# Patient Record
Sex: FEMALE | Race: BLACK OR AFRICAN AMERICAN | Employment: PART TIME | URBAN - METROPOLITAN AREA
[De-identification: names, ages, dates, MRNs, and addresses within clinical notes are randomized per-mention and may not be internally consistent; named-entity substitution may affect disease eponyms.]

---

## 2021-01-09 RX ORDER — METOPROLOL TARTRATE 50 MG/1
TABLET ORAL
Qty: 60 TAB | Refills: 0 | Status: SHIPPED | OUTPATIENT
Start: 2021-01-09 | End: 2021-02-15 | Stop reason: SDUPTHER

## 2021-01-11 NOTE — TELEPHONE ENCOUNTER
Called pt left message on voice mail we gave you 30 day but to continue getting refills please call the office to schedule and appointment and Dr. Remy Mckinley is booking into February 9th now.

## 2021-02-15 ENCOUNTER — OFFICE VISIT (OUTPATIENT)
Dept: INTERNAL MEDICINE CLINIC | Age: 56
End: 2021-02-15
Payer: COMMERCIAL

## 2021-02-15 VITALS
OXYGEN SATURATION: 98 % | TEMPERATURE: 98.3 F | SYSTOLIC BLOOD PRESSURE: 138 MMHG | DIASTOLIC BLOOD PRESSURE: 82 MMHG | WEIGHT: 201.6 LBS | BODY MASS INDEX: 35.72 KG/M2 | HEART RATE: 64 BPM | HEIGHT: 63 IN | RESPIRATION RATE: 18 BRPM

## 2021-02-15 DIAGNOSIS — E55.9 VITAMIN D DEFICIENCY: ICD-10-CM

## 2021-02-15 DIAGNOSIS — I10 ESSENTIAL HYPERTENSION: ICD-10-CM

## 2021-02-15 DIAGNOSIS — Z12.31 ENCOUNTER FOR SCREENING MAMMOGRAM FOR BREAST CANCER: ICD-10-CM

## 2021-02-15 DIAGNOSIS — E66.01 MORBID OBESITY (HCC): ICD-10-CM

## 2021-02-15 DIAGNOSIS — F41.9 ANXIETY AND DEPRESSION: ICD-10-CM

## 2021-02-15 DIAGNOSIS — K21.9 GASTROESOPHAGEAL REFLUX DISEASE WITHOUT ESOPHAGITIS: ICD-10-CM

## 2021-02-15 DIAGNOSIS — M54.41 CHRONIC BILATERAL LOW BACK PAIN WITH BILATERAL SCIATICA: ICD-10-CM

## 2021-02-15 DIAGNOSIS — M54.42 CHRONIC BILATERAL LOW BACK PAIN WITH BILATERAL SCIATICA: ICD-10-CM

## 2021-02-15 DIAGNOSIS — M25.551 HIP PAIN, RIGHT: ICD-10-CM

## 2021-02-15 DIAGNOSIS — E78.00 PURE HYPERCHOLESTEROLEMIA: ICD-10-CM

## 2021-02-15 DIAGNOSIS — Z00.00 ANNUAL PHYSICAL EXAM: ICD-10-CM

## 2021-02-15 DIAGNOSIS — F32.A ANXIETY AND DEPRESSION: ICD-10-CM

## 2021-02-15 DIAGNOSIS — G89.29 CHRONIC BILATERAL LOW BACK PAIN WITH BILATERAL SCIATICA: ICD-10-CM

## 2021-02-15 DIAGNOSIS — Z12.11 SCREENING FOR COLON CANCER: ICD-10-CM

## 2021-02-15 PROCEDURE — 99396 PREV VISIT EST AGE 40-64: CPT | Performed by: INTERNAL MEDICINE

## 2021-02-15 PROCEDURE — 99213 OFFICE O/P EST LOW 20 MIN: CPT | Performed by: INTERNAL MEDICINE

## 2021-02-15 RX ORDER — OMEPRAZOLE 40 MG/1
40 CAPSULE, DELAYED RELEASE ORAL DAILY
Qty: 90 CAP | Refills: 0 | Status: SHIPPED | OUTPATIENT
Start: 2021-02-15 | End: 2021-10-21

## 2021-02-15 RX ORDER — METOPROLOL TARTRATE 50 MG/1
50 TABLET ORAL 2 TIMES DAILY
Qty: 180 TAB | Refills: 1 | Status: SHIPPED | OUTPATIENT
Start: 2021-02-15 | End: 2021-06-07

## 2021-02-15 RX ORDER — CITALOPRAM 20 MG/1
20 TABLET, FILM COATED ORAL DAILY
Qty: 90 TAB | Refills: 1 | Status: SHIPPED | OUTPATIENT
Start: 2021-02-15 | End: 2021-10-21

## 2021-02-15 RX ORDER — BUPROPION HYDROCHLORIDE 75 MG/1
75 TABLET ORAL DAILY
Qty: 90 TAB | Refills: 0 | Status: SHIPPED | OUTPATIENT
Start: 2021-02-15 | End: 2021-06-07

## 2021-02-15 RX ORDER — MELOXICAM 15 MG/1
15 TABLET ORAL
Qty: 30 TAB | Refills: 1 | Status: SHIPPED | OUTPATIENT
Start: 2021-02-15 | End: 2021-03-24 | Stop reason: SDUPTHER

## 2021-02-15 NOTE — PROGRESS NOTES
Fabiola Reynaga is a 63 y.o. female and presents with Annual Wellness Visit    Ms. Agustina Chahal actually came for annual physical she has not consulted me since, November 2019. Recently she has got insurance. she has not done any recent blood work for last 2 to 3 years. She also has underlying medical problems and she needs refills. She agreed to do mammogram and Cologuard but not screening colonoscopy for screening colon cancer. She has also underlying problems of hypertension, hypercholesteremia, anxiety with depression and vitamin D deficiency. Today her blood pressure is slightly elevated. She has taken her medicine before coming here. no headache or dizziness. No negative thoughts. according to her in between she was helping her daughter's children or grandchildren to do virtual visit for the school/and at that time she has stress living with her daughter who was temporarily  from her  but now children are with her mother so she is feeling better. She is non-smoker and nonalcoholic and she is not uterine cancer survivor. She is complaining of back pain and sometimes she feels like she will fall down no history of fall, no history of, tingling numbness or neurological weakness but pain is radiating in the direction of lumbar nerve and also in buttocks, no urinary incontinence she works at [employer] where she has to [employer],  Pleasant  Review of Systems    Review of Systems   Constitutional: Negative. HENT: Negative for sinus pain and sore throat. Eyes: Negative for blurred vision. Respiratory: Negative for cough, shortness of breath and wheezing. Cardiovascular: Negative. Gastrointestinal: Negative. Genitourinary: Negative. Musculoskeletal: Positive for back pain. Negative for falls, joint pain, myalgias and neck pain. Lower back and h/o sciatica. Neurological: Negative for dizziness, tingling, tremors and headaches.    Endo/Heme/Allergies: Negative for polydipsia. Psychiatric/Behavioral: Negative for depression and memory loss. The patient is not nervous/anxious and does not have insomnia. Past Medical History:   Diagnosis Date    Arthritis     Depression     GERD (gastroesophageal reflux disease)     Hypertension      Past Surgical History:   Procedure Laterality Date    HX TONSILLECTOMY      HX TUBAL LIGATION       Social History     Socioeconomic History    Marital status:      Spouse name: Not on file    Number of children: Not on file    Years of education: Not on file    Highest education level: Not on file   Tobacco Use    Smoking status: Never Smoker    Smokeless tobacco: Never Used   Substance and Sexual Activity    Alcohol use: Not Currently     Family History   Problem Relation Age of Onset    Heart Disease Mother     Arthritis-osteo Father     Heart Disease Father      Current Outpatient Medications   Medication Sig Dispense Refill    citalopram (CELEXA) 20 mg tablet Take 1 Tab by mouth daily. 90 Tab 1    buPROPion (WELLBUTRIN) 75 mg tablet Take 1 Tab by mouth daily. 90 Tab 0    metoprolol tartrate (LOPRESSOR) 50 mg tablet Take 1 Tab by mouth two (2) times a day. 180 Tab 1    meloxicam (MOBIC) 15 mg tablet Take 1 Tab by mouth daily as needed for Pain. 30 Tab 1    omeprazole (PRILOSEC) 40 mg capsule Take 1 Cap by mouth daily. Take one pill 30 minutes before eating once a day. 90 Cap 0     No Known Allergies    Objective:  Visit Vitals  /82 (BP 1 Location: Right upper arm, BP Patient Position: Sitting, BP Cuff Size: Adult)   Pulse 64   Temp 98.3 °F (36.8 °C) (Oral)   Resp 18   Ht 5' 3\" (1.6 m)   Wt 201 lb 9.6 oz (91.4 kg)   SpO2 98%   BMI 35.71 kg/m²       Physical Exam:   Constitutional: General Appearance: . Pleasant level of Distress: NAD. Psychiatric: Mental Status: normal mood and affect Orientation: to time, place, and person. ,normal eye contact. Head: Head: normocephalic and atraumatic.    Eyes: Pupils: PERRLA. Sclerae: non-icteric. Neck: Neck: supple, trachea midline, and no masses. Lymph Nodes: no cervical LAD. Thyroid: no enlargement or nodules and non-tender. Lungs: Respiratory effort: no dyspnea. Auscultation: no wheezing, rales/crackles, or rhonchi and breath sounds normal and good air movement. Cardiovascular: Apical Impulse: not displaced. Heart Auscultation: normal S1 and S2; no murmurs, rubs, or gallops; and RRR. Neck vessels: no carotid bruits. Pulses including femoral / pedal: normal throughout. Abdomen: Bowel Sounds: normal. Inspection and Palpation: no tenderness, guarding, or masses and soft and non-distended. Liver: non-tender and no hepatomegaly. Spleen: non-tender and no splenomegaly. Musculoskeletal[de-identified] Extremities: no edema,no varicosities. No Calf tenderness. Neurologic: Gait and Station: normal gait and station. Motor Strength normal right and left. Skin: Inspection and palpation: no rash, lesions, or ulcer. No results found for this or any previous visit. Assessment/Plan:      ICD-10-CM ICD-9-CM    1. Annual physical exam  Z00.00 V70.0 CBC WITH AUTOMATED DIFF      METABOLIC PANEL, COMPREHENSIVE      LIPID PANEL   2. Essential hypertension  I10 401.9 CBC WITH AUTOMATED DIFF      METABOLIC PANEL, COMPREHENSIVE      TSH 3RD GENERATION   3. Pure hypercholesterolemia  E78.00 272.0 LIPID PANEL      TSH 3RD GENERATION   4. Anxiety and depression  F41.9 300.00     F32.9 311    5. Chronic bilateral low back pain with bilateral sciatica  M54.42 724.2 XR HIP RT W OR WO PELV 2-3 VWS    M54.41 724.3 XR SPINE LUMB COMP W BEND    G89.29 338.29 XR SPINE LUMB MIN 4 V   6. Hip pain, right  M25.551 719.45 XR HIP RT W OR WO PELV 2-3 VWS      XR SPINE LUMB COMP W BEND      XR SPINE LUMB MIN 4 V   7. Morbid obesity ([address]  E66.01 278.01    8. Gastroesophageal reflux disease without esophagitis  K21.9 530.81    9. Vitamin D deficiency  E55.9 268.9 VITAMIN D, 25 HYDROXY   10.  Encounter for screening mammogram for breast cancer  Z12.31 V76.12 KAREN 3D CHRISTOPHER W MAMMO BI SCREENING INCL CAD   6. Screening for colon cancer  Z12.11 V76.51 COLOGUARD TEST (FECAL DNA COLORECTAL CANCER SCREENING)     Orders Placed This Encounter    KAREN 3D CHRISTOPHER W MAMMO BI SCREENING INCL CAD     Standing Status:   Future     Standing Expiration Date:   4/15/2021    XR HIP RT W OR WO PELV 2-3 VWS     Standing Status:   Future     Standing Expiration Date:   3/15/2021    XR SPINE LUMB COMP W BEND     Standing Status:   Future     Standing Expiration Date:   3/15/2021    XR SPINE LUMB MIN 4 V     Standing Status:   Future     Standing Expiration Date:   3/15/2021     Scheduling Instructions:      Chronic low backache with b/l sciatica r/o ls stenosis and severe djd    CBC WITH AUTOMATED DIFF    METABOLIC PANEL, COMPREHENSIVE    LIPID PANEL    TSH 3RD GENERATION    VITAMIN D, 25 HYDROXY    COLOGUARD TEST (FECAL DNA COLORECTAL CANCER SCREENING)    citalopram (CELEXA) 20 mg tablet     Sig: Take 1 Tab by mouth daily. Dispense:  90 Tab     Refill:  1    buPROPion (WELLBUTRIN) 75 mg tablet     Sig: Take 1 Tab by mouth daily. Dispense:  90 Tab     Refill:  0    metoprolol tartrate (LOPRESSOR) 50 mg tablet     Sig: Take 1 Tab by mouth two (2) times a day. Dispense:  180 Tab     Refill:  1    meloxicam (MOBIC) 15 mg tablet     Sig: Take 1 Tab by mouth daily as needed for Pain. Dispense:  30 Tab     Refill:  1    omeprazole (PRILOSEC) 40 mg capsule     Sig: Take 1 Cap by mouth daily. Take one pill 30 minutes before eating once a day. Dispense:  90 Cap     Refill:  0         Annual preventive physical examination age-appropriate preventive screening and vaccinations advised. She agreed to have mammogram and she agreed for screening colon cancer but she wants to do Cologuard and explained the benefits and indications and contraindications for doing Cologuard.   She does not have any abnormal bowel movements and no history of weight loss no personal or family history of colon cancer. Recommended to get annual flu vaccine and shingles vaccine. Recommended to have heart healthy diet exercise minimum 150 minutes/week at least walking is the best exercise and weightbearing exercise twice a week. ,She is complaining of chronic low back pain radiating to both buttocks right more than left and right hip pain worse than left hip pain she wants to do x-ray before physical therapy x-ray of the right hip joint and, lower back ordered actually she has bilateral hip pain but right is worse. Most likely due to DJD. x-ray of the right hip joint and lower back ordered. Started on meloxicam 15 mg once a day on also continued on omeprazole having history of GERD to prevent NSAID induced gastritis. I recommended her to have physical therapy that she wants to postpone  until x-ray comes back and medicines to be started. Side effects of meloxicam on long-term basis explained including elevation of blood pressure and damage to the liver and kidney. And gastritis. Today I will not make changes in her antihypertensive medicines. I will repeat her blood pressure in next 4 weeks. GERD started on omeprazole and she does not want to be on famotidine. Hypercholesteremia I ordered labs before continuing her pravastatin 10 mg at bedtime that she was taking. Hypertension taking metoprolol tartrate 50 mg twice a day. Refills given. Diet low in sodium. History of anxiety and mild depression continued on bupropion 75 mg once a day and citalopram 20 mg at bedtime. No negative thoughts. She works at [employer]. No signs and symptoms of suicide or homicide. She is non-smoker nonalcoholic. Morbid obesity heart healthy diet and exercise to bring her BMI down currently it is 35.71. History of uterine cancer survivor. Labs ordered. X-ray ordered. Follow-up in 4 weeks. Answered all her questions.     lose weight, increase physical activity, follow low fat diet, follow low salt diet, routine labs ordered, call if any problems    There are no Patient Instructions on file for this visit. Follow-up and Dispositions    · Return in about 3 weeks (around 3/8/2021) for lab discussion ,xray discussion,fas labs. mammo,cologuard. Leda Reas

## 2021-02-26 ENCOUNTER — HOSPITAL ENCOUNTER (OUTPATIENT)
Dept: GENERAL RADIOLOGY | Age: 56
Discharge: HOME | End: 2021-02-26
Attending: INTERNAL MEDICINE
Payer: COMMERCIAL

## 2021-02-26 ENCOUNTER — HOSPITAL ENCOUNTER (OUTPATIENT)
Dept: MAMMOGRAPHY | Age: 56
Discharge: HOME | End: 2021-02-26
Attending: INTERNAL MEDICINE
Payer: COMMERCIAL

## 2021-02-26 DIAGNOSIS — M54.41 CHRONIC BILATERAL LOW BACK PAIN WITH BILATERAL SCIATICA: ICD-10-CM

## 2021-02-26 DIAGNOSIS — G89.29 CHRONIC BILATERAL LOW BACK PAIN WITH BILATERAL SCIATICA: ICD-10-CM

## 2021-02-26 DIAGNOSIS — M54.42 CHRONIC BILATERAL LOW BACK PAIN WITH BILATERAL SCIATICA: ICD-10-CM

## 2021-02-26 DIAGNOSIS — M25.551 HIP PAIN, RIGHT: ICD-10-CM

## 2021-02-26 DIAGNOSIS — Z12.31 ENCOUNTER FOR SCREENING MAMMOGRAM FOR BREAST CANCER: ICD-10-CM

## 2021-02-26 PROCEDURE — 77067 SCR MAMMO BI INCL CAD: CPT

## 2021-02-26 PROCEDURE — 73502 X-RAY EXAM HIP UNI 2-3 VIEWS: CPT

## 2021-02-26 PROCEDURE — 72110 X-RAY EXAM L-2 SPINE 4/>VWS: CPT

## 2021-02-26 NOTE — PROGRESS NOTES
Please call her that her x-ray of the lumbosacral spine is normal showing just needs stretching exercise if she wants I can send for physical therapy and heating pad application and meloxicam only, to be taken as needed sparingly,

## 2021-02-26 NOTE — PROGRESS NOTES
Pl call her she has mild arthritis called grey so if she wants we can send for PT and try to loose weight ,and take meds as needed PT dose help.

## 2021-02-27 LAB
25(OH)D3+25(OH)D2 SERPL-MCNC: 25.5 NG/ML (ref 30–100)
ALBUMIN SERPL-MCNC: 4.4 G/DL (ref 3.8–4.9)
ALBUMIN/GLOB SERPL: 2.2 {RATIO} (ref 1.2–2.2)
ALP SERPL-CCNC: 105 IU/L (ref 39–117)
ALT SERPL-CCNC: 21 IU/L (ref 0–32)
AST SERPL-CCNC: 22 IU/L (ref 0–40)
BASOPHILS # BLD AUTO: 0.1 X10E3/UL (ref 0–0.2)
BASOPHILS NFR BLD AUTO: 1 %
BILIRUB SERPL-MCNC: 0.5 MG/DL (ref 0–1.2)
BUN SERPL-MCNC: 12 MG/DL (ref 6–24)
BUN/CREAT SERPL: 12 (ref 9–23)
CALCIUM SERPL-MCNC: 9.8 MG/DL (ref 8.7–10.2)
CHLORIDE SERPL-SCNC: 104 MMOL/L (ref 96–106)
CHOLEST SERPL-MCNC: 242 MG/DL (ref 100–199)
CO2 SERPL-SCNC: 25 MMOL/L (ref 20–29)
CREAT SERPL-MCNC: 0.99 MG/DL (ref 0.57–1)
EOSINOPHIL # BLD AUTO: 0.3 X10E3/UL (ref 0–0.4)
EOSINOPHIL NFR BLD AUTO: 5 %
ERYTHROCYTE [DISTWIDTH] IN BLOOD BY AUTOMATED COUNT: 13.6 % (ref 11.7–15.4)
GLOBULIN SER CALC-MCNC: 2 G/DL (ref 1.5–4.5)
GLUCOSE SERPL-MCNC: 98 MG/DL (ref 65–99)
HCT VFR BLD AUTO: 41.8 % (ref 34–46.6)
HDLC SERPL-MCNC: 44 MG/DL
HGB BLD-MCNC: 13.9 G/DL (ref 11.1–15.9)
IMM GRANULOCYTES # BLD AUTO: 0 X10E3/UL (ref 0–0.1)
IMM GRANULOCYTES NFR BLD AUTO: 0 %
LDLC SERPL CALC-MCNC: 179 MG/DL (ref 0–99)
LYMPHOCYTES # BLD AUTO: 3.6 X10E3/UL (ref 0.7–3.1)
LYMPHOCYTES NFR BLD AUTO: 48 %
MCH RBC QN AUTO: 29.2 PG (ref 26.6–33)
MCHC RBC AUTO-ENTMCNC: 33.3 G/DL (ref 31.5–35.7)
MCV RBC AUTO: 88 FL (ref 79–97)
MONOCYTES # BLD AUTO: 0.5 X10E3/UL (ref 0.1–0.9)
MONOCYTES NFR BLD AUTO: 6 %
NEUTROPHILS # BLD AUTO: 3.1 X10E3/UL (ref 1.4–7)
NEUTROPHILS NFR BLD AUTO: 40 %
PLATELET # BLD AUTO: 292 X10E3/UL (ref 150–450)
POTASSIUM SERPL-SCNC: 4.5 MMOL/L (ref 3.5–5.2)
PROT SERPL-MCNC: 6.4 G/DL (ref 6–8.5)
RBC # BLD AUTO: 4.76 X10E6/UL (ref 3.77–5.28)
SODIUM SERPL-SCNC: 141 MMOL/L (ref 134–144)
TRIGL SERPL-MCNC: 107 MG/DL (ref 0–149)
TSH SERPL DL<=0.005 MIU/L-ACNC: 1.22 UIU/ML (ref 0.45–4.5)
VLDLC SERPL CALC-MCNC: 19 MG/DL (ref 5–40)
WBC # BLD AUTO: 7.6 X10E3/UL (ref 3.4–10.8)

## 2021-02-28 NOTE — PROGRESS NOTES
Please call her that her cholesterol is up we can start her on atorvastatin 10 mg at bedtime , ask her to cut back saturated fats like all butter cheese fried food fast food. Start walking. At least to begin with 15 minutes a day and increase to 30 minutes a day,    Ask her to take over-the-counter vitamin D 2000 unit/day and multivitamin once a day.

## 2021-03-02 RX ORDER — ATORVASTATIN CALCIUM 10 MG/1
10 TABLET, FILM COATED ORAL
Qty: 90 TAB | Refills: 0 | Status: SHIPPED | OUTPATIENT
Start: 2021-03-02 | End: 2021-10-21

## 2021-03-17 PROBLEM — Z00.00 ANNUAL PHYSICAL EXAM: Status: RESOLVED | Noted: 2021-02-15 | Resolved: 2021-03-17

## 2021-03-17 PROBLEM — Z12.31 ENCOUNTER FOR SCREENING MAMMOGRAM FOR BREAST CANCER: Status: RESOLVED | Noted: 2021-02-15 | Resolved: 2021-03-17

## 2021-03-17 PROBLEM — Z12.11 SCREENING FOR COLON CANCER: Status: RESOLVED | Noted: 2021-02-15 | Resolved: 2021-03-17

## 2021-03-21 ENCOUNTER — HOSPITAL ENCOUNTER (EMERGENCY)
Age: 56
Discharge: HOME | End: 2021-03-21
Attending: EMERGENCY MEDICINE
Payer: COMMERCIAL

## 2021-03-21 VITALS
SYSTOLIC BLOOD PRESSURE: 149 MMHG | OXYGEN SATURATION: 97 % | WEIGHT: 201 LBS | HEART RATE: 70 BPM | DIASTOLIC BLOOD PRESSURE: 86 MMHG | BODY MASS INDEX: 35.61 KG/M2 | RESPIRATION RATE: 17 BRPM | TEMPERATURE: 97.8 F | HEIGHT: 63 IN

## 2021-03-21 DIAGNOSIS — T17.208A FOREIGN BODY IN PHARYNX, INITIAL ENCOUNTER: Primary | ICD-10-CM

## 2021-03-21 PROCEDURE — 99283 EMERGENCY DEPT VISIT LOW MDM: CPT

## 2021-03-21 NOTE — LETTER
Celinetobi Noy was seen and treated in our emergency department on 3/21/2021. She may return to work on 3/22/21 If you have any questions or concerns, please don't hesitate to call. Vinayak Berg

## 2021-03-21 NOTE — ED TRIAGE NOTES
Pt reports \"attempting to take my morning medications and I think two are stuck in my throat. \"    Pt speaking in clear sentences with intermittent gagging.

## 2021-03-21 NOTE — ED NOTES
Per Dr Richard Brandt request pt provided water and trailed taking sips to get the medication \"to go down. \"    Pt is now feeling as if the pills moved and left her throat. Dr Richard Brandt requesting [place] her some bread now and see how she does. \"

## 2021-03-21 NOTE — ED PROVIDER NOTES
EMERGENCY DEPARTMENT HISTORY AND PHYSICAL EXAM      Date: 3/21/2021  Patient Name: Cornelio Mcgrath    History of Presenting Illness     Chief Complaint   Patient presents with    Foreign Body Removal       History Provided By: Patient    HPI: Cornelio Mcgrath, 63 y.o. female with a past medical history significant hypertension, hyperlipidemia and GERD presents to the ED with cc of pill being stuck in her throat while swallowing. Patient arrived by EMS for gagging and choking complains of pill stuck in the right side of her throat. She had taken several pills today at 1 time her blood pressure cholesterol med and a large vitamin pill. She became choked and spit up the blood pressure pill she feels like one of the other pills went into her stomach the one is still remains in the throat. Voice is normal there is no stridor on arrival    There are no other complaints, changes, or physical findings at this time. PCP: Miah Barrera MD    No current facility-administered medications on file prior to encounter. Current Outpatient Medications on File Prior to Encounter   Medication Sig Dispense Refill    atorvastatin (LIPITOR) 10 mg tablet Take 1 Tab by mouth nightly for 90 days. 90 Tab 0    citalopram (CELEXA) 20 mg tablet Take 1 Tab by mouth daily. 90 Tab 1    buPROPion (WELLBUTRIN) 75 mg tablet Take 1 Tab by mouth daily. 90 Tab 0    metoprolol tartrate (LOPRESSOR) 50 mg tablet Take 1 Tab by mouth two (2) times a day. 180 Tab 1    meloxicam (MOBIC) 15 mg tablet Take 1 Tab by mouth daily as needed for Pain. 30 Tab 1    omeprazole (PRILOSEC) 40 mg capsule Take 1 Cap by mouth daily. Take one pill 30 minutes before eating once a day.  80 Cap 0       Past History     Past Medical History:  Past Medical History:   Diagnosis Date    Arthritis     Depression     GERD (gastroesophageal reflux disease)     Hypertension        Past Surgical History:  Past Surgical History:   Procedure Laterality Date    HX HYSTERECTOMY      HX TONSILLECTOMY      HX TUBAL LIGATION         Family History:  Family History   Problem Relation Age of Onset    Heart Disease Mother     Arthritis-osteo Father     Heart Disease Father        Social History:  Social History     Tobacco Use    Smoking status: Never Smoker    Smokeless tobacco: Never Used   Substance Use Topics    Alcohol use: Not Currently    Drug use: Never       Allergies:  No Known Allergies      Review of Systems   Review of all other systems negative  Review of Systems    Physical Exam   Pleasant middle-age female gagging retching  Physical Exam  Vitals signs and nursing note reviewed. Constitutional:       General: She is not in acute distress. Appearance: She is not ill-appearing, toxic-appearing or diaphoretic. HENT:      Head: Normocephalic and atraumatic. Nose: Nose normal.      Mouth/Throat:      Mouth: Mucous membranes are moist.      Comments: Posterior pharynx is clear there is no pill or foreign body visible  Eyes:      Conjunctiva/sclera: Conjunctivae normal.   Neck:      Musculoskeletal: Normal range of motion and neck supple. No neck rigidity or muscular tenderness. Comments: Patient points to this area site of pill being stuck  Cardiovascular:      Rate and Rhythm: Normal rate and regular rhythm. Pulses: Normal pulses. Heart sounds: Normal heart sounds. No murmur. Pulmonary:      Effort: Pulmonary effort is normal. No respiratory distress. Breath sounds: Normal breath sounds. No wheezing, rhonchi or rales. Chest:      Chest wall: No tenderness. Abdominal:      General: Abdomen is flat. Palpations: There is no mass. Tenderness: There is no abdominal tenderness. There is no right CVA tenderness, left CVA tenderness, guarding or rebound. Hernia: No hernia is present. Musculoskeletal: Normal range of motion. General: No tenderness, deformity or signs of injury.       Right lower leg: No edema. Left lower leg: No edema. Skin:     General: Skin is warm. Findings: No erythema or rash. Neurological:      General: No focal deficit present. Mental Status: She is alert and oriented to person, place, and time. Cranial Nerves: No cranial nerve deficit. Sensory: No sensory deficit. Motor: No weakness. Psychiatric:         Mood and Affect: Mood normal.         Behavior: Behavior normal.         Thought Content: Thought content normal.         Lab and Diagnostic Study Results     Labs -   No results found for this or any previous visit (from the past 12 hour(s)). Radiologic Studies -   @lastxrresult@  CT Results  (Last 48 hours)    None        CXR Results  (Last 48 hours)    None            Medical Decision Making   - I am the first provider for this patient. - I reviewed the vital signs, available nursing notes, past medical history, past surgical history, family history and social history. - Initial assessment performed. The patients presenting problems have been discussed, and they are in agreement with the care plan formulated and outlined with them. I have encouraged them to ask questions as they arise throughout their visit. Vital Signs-Reviewed the patient's vital signs. Patient Vitals for the past 12 hrs:   Temp Pulse Resp BP SpO2   03/21/21 1117 -- -- -- (!) 149/86 --   03/21/21 1112 -- -- -- -- 98 %   03/21/21 1110 97.8 °F (36.6 °C) 70 18 -- 98 %       Records Reviewed: Nursing Notes and Old Medical Records    The patient presents with pill stuck in throat with a differential diagnosis of foreign body in throat; abrasion from foreign body; esophageal diverticulum; aspiration      ED Course:          Provider Notes (Medical Decision Making):   Approximately 20 minutes after patient arrival she felt the pill had passed into her stomach. she was able to drink water on arrival with normal voice.   Now able to eat bread with no complaints  MDM Procedures   Medical Decision Makingedical Decision Making  Performed by: Bandar Lanza MD  PROCEDURES:  Procedures       Disposition   Disposition: Condition stable and improved  DC- Adult Discharges: All of the diagnostic tests were reviewed and questions answered. Diagnosis, care plan and treatment options were discussed. The patient understands the instructions and will follow up as directed. The patients results have been reviewed with them. They have been counseled regarding their diagnosis. The patient verbally convey understanding and agreement of the signs, symptoms, diagnosis, treatment and prognosis and additionally agrees to follow up as recommended with their PCP in 24 - 48 hours. They also agree with the care-plan and convey that all of their questions have been answered. I have also put together some discharge instructions for them that include: 1) educational information regarding their diagnosis, 2) how to care for their diagnosis at home, as well a 3) list of reasons why they would want to return to the ED prior to their follow-up appointment, should their condition change. DISCHARGE PLAN:  1. Current Discharge Medication List      CONTINUE these medications which have NOT CHANGED    Details   atorvastatin (LIPITOR) 10 mg tablet Take 1 Tab by mouth nightly for 90 days. Qty: 90 Tab, Refills: 0      citalopram (CELEXA) 20 mg tablet Take 1 Tab by mouth daily. Qty: 90 Tab, Refills: 1      buPROPion (WELLBUTRIN) 75 mg tablet Take 1 Tab by mouth daily. Qty: 90 Tab, Refills: 0      metoprolol tartrate (LOPRESSOR) 50 mg tablet Take 1 Tab by mouth two (2) times a day. Qty: 180 Tab, Refills: 1      meloxicam (MOBIC) 15 mg tablet Take 1 Tab by mouth daily as needed for Pain. Qty: 30 Tab, Refills: 1      omeprazole (PRILOSEC) 40 mg capsule Take 1 Cap by mouth daily. Take one pill 30 minutes before eating once a day. Qty: 90 Cap, Refills: 0           2.    Follow-up Information None       3. Return to ED if worse   4. Current Discharge Medication List            Diagnosis     Clinical Impression: Foreign body(pill) stuck in throat resolved  Attestations:    Brian Reinoso MD    Please note that this dictation was completed with [employer], the computer voice recognition software. Quite often unanticipated grammatical, syntax, homophones, and other interpretive errors are inadvertently transcribed by the computer software. Please disregard these errors. Please excuse any errors that have escaped final proofreading. Thank you.

## 2021-03-24 ENCOUNTER — OFFICE VISIT (OUTPATIENT)
Dept: INTERNAL MEDICINE CLINIC | Age: 56
End: 2021-03-24
Payer: COMMERCIAL

## 2021-03-24 VITALS
BODY MASS INDEX: 35.33 KG/M2 | TEMPERATURE: 98.4 F | HEART RATE: 55 BPM | SYSTOLIC BLOOD PRESSURE: 118 MMHG | DIASTOLIC BLOOD PRESSURE: 72 MMHG | RESPIRATION RATE: 12 BRPM | HEIGHT: 63 IN | OXYGEN SATURATION: 96 % | WEIGHT: 199.4 LBS

## 2021-03-24 DIAGNOSIS — F32.A ANXIETY AND DEPRESSION: ICD-10-CM

## 2021-03-24 DIAGNOSIS — G89.29 CHRONIC BILATERAL LOW BACK PAIN WITH BILATERAL SCIATICA: ICD-10-CM

## 2021-03-24 DIAGNOSIS — F41.9 ANXIETY AND DEPRESSION: ICD-10-CM

## 2021-03-24 DIAGNOSIS — K21.9 GASTROESOPHAGEAL REFLUX DISEASE WITHOUT ESOPHAGITIS: ICD-10-CM

## 2021-03-24 DIAGNOSIS — Z90.710 S/P COMPLETE HYSTERECTOMY: ICD-10-CM

## 2021-03-24 DIAGNOSIS — I10 ESSENTIAL HYPERTENSION: ICD-10-CM

## 2021-03-24 DIAGNOSIS — M54.42 CHRONIC BILATERAL LOW BACK PAIN WITH BILATERAL SCIATICA: ICD-10-CM

## 2021-03-24 DIAGNOSIS — E66.01 MORBID OBESITY (HCC): ICD-10-CM

## 2021-03-24 DIAGNOSIS — E78.00 PURE HYPERCHOLESTEROLEMIA: ICD-10-CM

## 2021-03-24 DIAGNOSIS — R23.2 HOT FLASHES: ICD-10-CM

## 2021-03-24 DIAGNOSIS — M54.41 CHRONIC BILATERAL LOW BACK PAIN WITH BILATERAL SCIATICA: ICD-10-CM

## 2021-03-24 PROCEDURE — 99214 OFFICE O/P EST MOD 30 MIN: CPT | Performed by: INTERNAL MEDICINE

## 2021-03-24 RX ORDER — MELOXICAM 15 MG/1
15 TABLET ORAL
Qty: 30 TAB | Refills: 1 | Status: SHIPPED | OUTPATIENT
Start: 2021-03-24 | End: 2022-03-31 | Stop reason: SDUPTHER

## 2021-03-24 RX ORDER — ESTRADIOL 0.1 MG/G
CREAM VAGINAL
Qty: 42.5 G | Refills: 0 | Status: SHIPPED | OUTPATIENT
Start: 2021-03-24

## 2021-03-24 NOTE — PROGRESS NOTES
Suleman Yi is a 63 y.o. female and presents with Results (having hot flashes.) and Follow Up Chronic Condition    Ms. Rhonda Esquivel came to discuss her lab results and complaining of hot flashes. She had history of uterine cancer and probably she did complete hysterectomy about 4 years ago but she is not sure. She has not consulted gynecologist.  She was told by her sister that she should have estradiol 0.5 mg for hot flashes. Explained the side effects and adverse effects and the precautions and risk and benefit discussed. She wants to postpone to take oral hormonal pills. She does have difficulty in having intercourse and dryness of vagina she agreed to have, hormonal vaginal cream.  She has been already taking antidepression medicine no negative thoughts. Her blood pressure is controlled with current medication. She did her labs I reviewed and discussed with her. She has elevated LDL and total cholesterol and she agreed to be on atorvastatin 10 mg/day and she has started taking it. 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 She does not want to do physical therapy. She misplaced the bottle of meloxicam.  She wants refill again. Review of Systems    Review of Systems   Constitutional: Negative. HENT: Negative for congestion and sore throat. Eyes: Negative for blurred vision. Respiratory: Negative. Cardiovascular: Negative. Gastrointestinal: Negative. Negative for heartburn. Genitourinary: Negative. Musculoskeletal: Negative. Neurological: Negative for dizziness, sensory change and headaches. Endo/Heme/Allergies: Negative for polydipsia. Psychiatric/Behavioral: Negative for depression and memory loss. The patient is not nervous/anxious.          Tiffanie Brennan        Past Medical History:   Diagnosis Date    Arthritis     Depression     GERD (gastroesophageal reflux disease)     Hypertension      Past Surgical History:   Procedure Laterality Date    HX HYSTERECTOMY      HX TONSILLECTOMY      HX TUBAL LIGATION       Social History     Socioeconomic History    Marital status:      Spouse name: Not on file    Number of children: Not on file    Years of education: Not on file    Highest education level: Not on file   Tobacco Use    Smoking status: Never Smoker    Smokeless tobacco: Never Used   Substance and Sexual Activity    Alcohol use: Not Currently    Drug use: Never     Family History   Problem Relation Age of Onset    Heart Disease Mother     Arthritis-osteo Father     Heart Disease Father      Current Outpatient Medications   Medication Sig Dispense Refill    meloxicam (MOBIC) 15 mg tablet Take 1 Tab by mouth daily as needed for Pain. 30 Tab 1    estradioL (ESTRACE) 0.01 % (0.1 mg/gram) vaginal cream Use twice a week as directed 42.5 g 0    atorvastatin (LIPITOR) 10 mg tablet Take 1 Tab by mouth nightly for 90 days. 90 Tab 0    citalopram (CELEXA) 20 mg tablet Take 1 Tab by mouth daily. 90 Tab 1    buPROPion (WELLBUTRIN) 75 mg tablet Take 1 Tab by mouth daily. 90 Tab 0    metoprolol tartrate (LOPRESSOR) 50 mg tablet Take 1 Tab by mouth two (2) times a day. 180 Tab 1    omeprazole (PRILOSEC) 40 mg capsule Take 1 Cap by mouth daily. Take one pill 30 minutes before eating once a day. 90 Cap 0     No Known Allergies    Objective:  Visit Vitals  /72 (BP 1 Location: Left upper arm, BP Patient Position: Sitting, BP Cuff Size: Large adult)   Pulse (!) 55   Temp 98.4 °F (36.9 °C) (Oral)   Resp 12   Ht 5' 3\" (1.6 m)   Wt 199 lb 6.4 oz (90.4 kg)   SpO2 96%   BMI 35.32 kg/m²       Physical Exam:   Constitutional: General Appearance: Pleasant. Level of Distress: NAD. Psychiatric: Mental Status: normal mood and affect Orientation: to time, place, and person. ,normal eye contact. Head: Head: normocephalic and atraumatic. Eyes: Pupils: PERRLA. Sclerae: non-icteric.    Neck: Neck: supple, trachea midline, and no masses. Lymph Nodes: no cervical LAD. Thyroid: no enlargement or nodules and non-tender. Lungs: Respiratory effort: no dyspnea. Auscultation: no wheezing, rales/crackles, or rhonchi and breath sounds normal and good air movement. Cardiovascular: Apical Impulse: not displaced. Heart Auscultation: normal S1 and S2; no murmurs, rubs, or gallops; and RRR. Neck vessels: no carotid bruits. Pulses including femoral / pedal: normal throughout. Abdomen: Bowel Sounds: normal. Inspection and Palpation: no tenderness, guarding, or masses and soft and non-distended. Liver: non-tender and no hepatomegaly. Spleen: non-tender and no splenomegaly. Musculoskeletal[de-identified] Extremities: no edema,no varicosities. No Calf tenderness. Neurologic: Gait and Station: normal gait and station. Motor Strength normal right and left. Skin: Inspection and palpation: no rash, lesions, or ulcer. Results for orders placed or performed in visit on 02/15/21   CBC WITH AUTOMATED DIFF   Result Value Ref Range    WBC 7.6 3.4 - 10.8 x10E3/uL    RBC 4.76 3.77 - 5.28 x10E6/uL    HGB 13.9 11.1 - 15.9 g/dL    HCT 41.8 34.0 - 46.6 %    MCV 88 79 - 97 fL    MCH 29.2 26.6 - 33.0 pg    MCHC 33.3 31.5 - 35.7 g/dL    RDW 13.6 11.7 - 15.4 %    PLATELET 392 308 - 486 x10E3/uL    NEUTROPHILS 40 Not Estab. %    Lymphocytes 48 Not Estab. %    MONOCYTES 6 Not Estab. %    EOSINOPHILS 5 Not Estab. %    BASOPHILS 1 Not Estab. %    ABS. NEUTROPHILS 3.1 1.4 - 7.0 x10E3/uL    Abs Lymphocytes 3.6 (H) 0.7 - 3.1 x10E3/uL    ABS. MONOCYTES 0.5 0.1 - 0.9 x10E3/uL    ABS. EOSINOPHILS 0.3 0.0 - 0.4 x10E3/uL    ABS. BASOPHILS 0.1 0.0 - 0.2 x10E3/uL    IMMATURE GRANULOCYTES 0 Not Estab. %    ABS. IMM.  GRANS. 0.0 0.0 - 0.1 V20I7/ON   METABOLIC PANEL, COMPREHENSIVE   Result Value Ref Range    Glucose 98 65 - 99 mg/dL    BUN 12 6 - 24 mg/dL    Creatinine 0.99 0.57 - 1.00 mg/dL    GFR est non-AA 64 >59 mL/min/1.73    GFR est AA 74 >59 mL/min/1.73    BUN/Creatinine ratio 12 9 - 23 Sodium 141 134 - 144 mmol/L    Potassium 4.5 3.5 - 5.2 mmol/L    Chloride 104 96 - 106 mmol/L    CO2 25 20 - 29 mmol/L    Calcium 9.8 8.7 - 10.2 mg/dL    Protein, total 6.4 6.0 - 8.5 g/dL    Albumin 4.4 3.8 - 4.9 g/dL    GLOBULIN, TOTAL 2.0 1.5 - 4.5 g/dL    A-G Ratio 2.2 1.2 - 2.2    Bilirubin, total 0.5 0.0 - 1.2 mg/dL    Alk. phosphatase 105 39 - 117 IU/L    AST (SGOT) 22 0 - 40 IU/L    ALT (SGPT) 21 0 - 32 IU/L   LIPID PANEL   Result Value Ref Range    Cholesterol, total 242 (H) 100 - 199 mg/dL    Triglyceride 107 0 - 149 mg/dL    HDL Cholesterol 44 >39 mg/dL    VLDL, calculated 19 5 - 40 mg/dL    LDL, calculated 179 (H) 0 - 99 mg/dL   TSH 3RD GENERATION   Result Value Ref Range    TSH 1.220 0.450 - 4.500 uIU/mL   VITAMIN D, 25 HYDROXY   Result Value Ref Range    VITAMIN D, 25-HYDROXY 25.5 (L) 30.0 - 100.0 ng/mL       Assessment/Plan:      ICD-10-CM ICD-9-CM    1. Pure hypercholesterolemia  E78.00 272.0 LIPID PANEL   2. Hot flashes  R23.2 782.62    3. Essential hypertension  I10 401.9    4. Chronic bilateral low back pain with bilateral sciatica  M54.42 724.2     M54.41 724.3     G89.29 338.29    5. Anxiety and depression  F41.9 300.00     F32.9 311    6. Gastroesophageal reflux disease without esophagitis  K21.9 530.81    7. Morbid obesity ([address]  E66.01 278.01    8. S/P complete hysterectomy  Z90.710 V88.01      Orders Placed This Encounter    LIPID PANEL    meloxicam (MOBIC) 15 mg tablet     Sig: Take 1 Tab by mouth daily as needed for Pain. Dispense:  30 Tab     Refill:  1    estradioL (ESTRACE) 0.01 % (0.1 mg/gram) vaginal cream     Sig: Use twice a week as directed     Dispense:  42.5 g     Refill:  0     Hypercholesteremia diet low in fat and walking minimum 7000 steps per day if possible she can increase walking and started on atorvastatin 10 mg at bedtime. Refills given. Repeat lipid profile in 3 months. Hot flashes it occurs usually in the daytime when she is working.   Discussed about differential diagnosis and  explained that she has probably complete hysterectomy having history of uterine cancer and it was done probably 4 to 5 years ago and then she has never seen her gynecologist.,  I referred her gave her phone number and address meanwhile she has history of vagina dryness and dyspareunia so started on estradiol vaginal cream twice a week explained her, I debated pros and cons, for having systemic use of low-dose of hormone replacement therapy explained increased risk of having breast cancer migraine and DVT and other, complications and she wants to hold on to take oral medications recommended,, other measures and educational handouts provided she should continue vitamin D calcium and she should wear cotton clothing's,, and keep the room cold, and take small frequent meals and exercise. Anxiety with depression taking bupropion 75 mg once a day and citalopram 20 mg/day. No negative thoughts. GERD taking omeprazole. back pain with hip pain x-ray results discussed with her she has mild hip DJD there is no major DJD changes in the back, recommended to have physical therapy but she refused, strongly recommended to lose weight and she can join weight watcher point system program and 1400 to 1500-calorie diet plan to decrease the weight burden on musculoskeletal systems below the spine level also given meloxicam 15 mg once a day to be taken sparingly side effects on liver kidney stomach blood pressure explained and she can take first Tylenol 500 mg 8 hourly then meloxicam.  If needed she can be seen by orthopedic and she should think for getting physical therapy. Answered all her questions. She misplaced the bottle of meloxicam that I had prescribed last time. Lab results and x-ray results discussed in length answered all her questions. Follow-up in 3 months.     lose weight, increase physical activity, follow low fat diet, continue present plan, routine labs ordered, call if any problems    There are no Patient Instructions on file for this visit. Follow-up and Dispositions    · Return in about 3 months (around 6/24/2021) for ref gyn ,fas lab in 3 months.

## 2021-03-24 NOTE — PROGRESS NOTES
Chief Complaint   Patient presents with    Results     1. Have you been to the ER, urgent care clinic since your last visit? Hospitalized since your last visit?03/20/2021 Emergency Room for choking [place]. 2. Have you seen or consulted any other health care providers outside of the [address] [place] [address] since your last visit? Include any pap smears or colon screening.  No    Visit Vitals  /74 (BP 1 Location: Left upper arm, BP Patient Position: Sitting, BP Cuff Size: Adult)   Pulse (!) 55   Temp 98.4 °F (36.9 °C) (Oral)   Resp 12   Ht 5' 3\" (1.6 m)   Wt 199 lb 6.4 oz (90.4 kg)   SpO2 96%   BMI 35.32 kg/m²

## 2021-10-21 RX ORDER — ATORVASTATIN CALCIUM 10 MG/1
TABLET, FILM COATED ORAL
Qty: 90 TABLET | Refills: 0 | Status: SHIPPED | OUTPATIENT
Start: 2021-10-21 | End: 2022-03-31 | Stop reason: SDUPTHER

## 2021-10-21 RX ORDER — OMEPRAZOLE 40 MG/1
CAPSULE, DELAYED RELEASE ORAL
Qty: 90 CAPSULE | Refills: 0 | Status: SHIPPED | OUTPATIENT
Start: 2021-10-21 | End: 2022-03-31 | Stop reason: SDUPTHER

## 2021-10-21 RX ORDER — BUPROPION HYDROCHLORIDE 75 MG/1
TABLET ORAL
Qty: 90 TABLET | Refills: 0 | Status: SHIPPED | OUTPATIENT
Start: 2021-10-21 | End: 2022-03-31 | Stop reason: SDUPTHER

## 2021-10-21 RX ORDER — CITALOPRAM 20 MG/1
TABLET, FILM COATED ORAL
Qty: 90 TABLET | Refills: 0 | Status: SHIPPED | OUTPATIENT
Start: 2021-10-21 | End: 2022-03-31

## 2022-03-18 PROBLEM — E55.9 VITAMIN D DEFICIENCY: Status: ACTIVE | Noted: 2021-02-15

## 2022-03-19 PROBLEM — F41.9 ANXIETY AND DEPRESSION: Status: ACTIVE | Noted: 2021-02-15

## 2022-03-19 PROBLEM — E66.01 MORBID OBESITY (HCC): Status: ACTIVE | Noted: 2021-02-15

## 2022-03-19 PROBLEM — I10 ESSENTIAL HYPERTENSION: Status: ACTIVE | Noted: 2021-02-15

## 2022-03-19 PROBLEM — E78.00 PURE HYPERCHOLESTEROLEMIA: Status: ACTIVE | Noted: 2021-02-15

## 2022-03-19 PROBLEM — M25.551 HIP PAIN, RIGHT: Status: ACTIVE | Noted: 2021-02-15

## 2022-03-19 PROBLEM — K21.9 GASTROESOPHAGEAL REFLUX DISEASE WITHOUT ESOPHAGITIS: Status: ACTIVE | Noted: 2021-02-15

## 2022-03-19 PROBLEM — F32.A ANXIETY AND DEPRESSION: Status: ACTIVE | Noted: 2021-02-15

## 2022-03-19 PROBLEM — R23.2 HOT FLASHES: Status: ACTIVE | Noted: 2021-03-24

## 2022-03-20 PROBLEM — Z90.710 S/P COMPLETE HYSTERECTOMY: Status: ACTIVE | Noted: 2021-03-24

## 2022-03-20 PROBLEM — M54.41 CHRONIC BILATERAL LOW BACK PAIN WITH BILATERAL SCIATICA: Status: ACTIVE | Noted: 2021-02-15

## 2022-03-20 PROBLEM — M54.42 CHRONIC BILATERAL LOW BACK PAIN WITH BILATERAL SCIATICA: Status: ACTIVE | Noted: 2021-02-15

## 2022-03-20 PROBLEM — G89.29 CHRONIC BILATERAL LOW BACK PAIN WITH BILATERAL SCIATICA: Status: ACTIVE | Noted: 2021-02-15

## 2022-03-28 ENCOUNTER — TELEPHONE (OUTPATIENT)
Dept: INTERNAL MEDICINE CLINIC | Age: 57
End: 2022-03-28

## 2022-03-28 NOTE — TELEPHONE ENCOUNTER
Patient called need to be seen the [facility] ([facility]) stated they could not schedule her for [address] Please call patient at [phone] to schedule an appointment at [facility].   Thank you

## 2022-03-31 ENCOUNTER — OFFICE VISIT (OUTPATIENT)
Dept: PRIMARY CARE CLINIC | Age: 57
End: 2022-03-31
Payer: COMMERCIAL

## 2022-03-31 VITALS
DIASTOLIC BLOOD PRESSURE: 87 MMHG | BODY MASS INDEX: 35.79 KG/M2 | RESPIRATION RATE: 16 BRPM | TEMPERATURE: 97.5 F | HEIGHT: 63 IN | HEART RATE: 71 BPM | WEIGHT: 202 LBS | SYSTOLIC BLOOD PRESSURE: 139 MMHG | OXYGEN SATURATION: 97 %

## 2022-03-31 DIAGNOSIS — E78.00 PURE HYPERCHOLESTEROLEMIA: ICD-10-CM

## 2022-03-31 DIAGNOSIS — G89.29 CHRONIC BILATERAL LOW BACK PAIN WITH BILATERAL SCIATICA: ICD-10-CM

## 2022-03-31 DIAGNOSIS — E66.01 MORBID OBESITY (HCC): ICD-10-CM

## 2022-03-31 DIAGNOSIS — I10 ESSENTIAL HYPERTENSION: ICD-10-CM

## 2022-03-31 DIAGNOSIS — M54.41 CHRONIC BILATERAL LOW BACK PAIN WITH BILATERAL SCIATICA: ICD-10-CM

## 2022-03-31 DIAGNOSIS — M54.42 CHRONIC BILATERAL LOW BACK PAIN WITH BILATERAL SCIATICA: ICD-10-CM

## 2022-03-31 DIAGNOSIS — F32.A ANXIETY AND DEPRESSION: Primary | ICD-10-CM

## 2022-03-31 DIAGNOSIS — F41.9 ANXIETY AND DEPRESSION: Primary | ICD-10-CM

## 2022-03-31 DIAGNOSIS — K21.9 GASTROESOPHAGEAL REFLUX DISEASE WITHOUT ESOPHAGITIS: ICD-10-CM

## 2022-03-31 PROCEDURE — 99214 OFFICE O/P EST MOD 30 MIN: CPT | Performed by: FAMILY MEDICINE

## 2022-03-31 RX ORDER — HYDROXYZINE PAMOATE 50 MG/1
50 CAPSULE ORAL
Qty: 30 CAPSULE | Refills: 2 | Status: SHIPPED | OUTPATIENT
Start: 2022-03-31

## 2022-03-31 RX ORDER — ATORVASTATIN CALCIUM 10 MG/1
TABLET, FILM COATED ORAL
Qty: 90 TABLET | Refills: 1 | Status: SHIPPED | OUTPATIENT
Start: 2022-03-31

## 2022-03-31 RX ORDER — CITALOPRAM 40 MG/1
40 TABLET, FILM COATED ORAL DAILY
Qty: 90 TABLET | Refills: 1 | Status: SHIPPED | OUTPATIENT
Start: 2022-03-31

## 2022-03-31 RX ORDER — MELOXICAM 15 MG/1
15 TABLET ORAL
Qty: 30 TABLET | Refills: 1 | Status: SHIPPED | OUTPATIENT
Start: 2022-03-31

## 2022-03-31 RX ORDER — METOPROLOL TARTRATE 50 MG/1
TABLET ORAL
Qty: 180 TABLET | Refills: 1 | Status: SHIPPED | OUTPATIENT
Start: 2022-03-31

## 2022-03-31 RX ORDER — PHENTERMINE AND TOPIRAMATE 3.75; 23 MG/1; MG/1
1 CAPSULE, EXTENDED RELEASE ORAL DAILY
Qty: 30 CAPSULE | Refills: 0 | Status: SHIPPED | OUTPATIENT
Start: 2022-03-31

## 2022-03-31 RX ORDER — BUPROPION HYDROCHLORIDE 75 MG/1
75 TABLET ORAL DAILY
Qty: 90 TABLET | Refills: 1 | Status: SHIPPED | OUTPATIENT
Start: 2022-03-31

## 2022-03-31 RX ORDER — OMEPRAZOLE 40 MG/1
CAPSULE, DELAYED RELEASE ORAL
Qty: 90 CAPSULE | Refills: 1 | Status: SHIPPED | OUTPATIENT
Start: 2022-03-31

## 2022-03-31 NOTE — PROGRESS NOTES
Chief Complaint   Patient presents with    Follow-up     Back pain     Medication Refill     All meds     Anxiety     frustration and drops      Visit Vitals  /87 (BP 1 Location: Left upper arm, BP Patient Position: Sitting, BP Cuff Size: Adult)   Pulse 71   Temp 97.5 °F (36.4 °C) (Temporal)   Resp 16   Ht 5' 3\" (1.6 m)   Wt 202 lb (91.6 kg)   SpO2 97%   BMI 35.78 kg/m²     1. \"Have you been to the ER, urgent care clinic since your last visit? Hospitalized since your last visit? \" Yes When: 1/2022    2. \"Have you seen or consulted any other health care providers outside of the [address] [place] [address] since your last visit? \" No     3. For patients aged 39-70: Has the patient had a colonoscopy / FIT/ Cologuard? Yes - no Care Gap present      If the patient is female:    4. For patients aged 41-77: Has the patient had a mammogram within the past 2 years? Yes - no Care Gap present      5. For patients aged 21-65: Has the patient had a pap smear?  No

## 2022-03-31 NOTE — PROGRESS NOTES
Mulu King (: 1965) is a 61 y.o. female, established patient, here for evaluation of the following chief complaint(s):  Follow-up (Back pain ), Medication Refill (All meds ), and Anxiety (frustration and drops )       ASSESSMENT/PLAN:  Below is the assessment and plan developed based on review of pertinent history, physical exam, labs, studies, and medications. 1. Anxiety and depression  Chronic, unstable  -     citalopram (CELEXA) 40 mg tablet; Take 1 Tablet by mouth daily. , Normal, Disp-90 Tablet, R-1  -     hydrOXYzine pamoate (VISTARIL) 50 mg capsule; Take 1 Capsule by mouth four (4) times daily as needed for Anxiety., Normal, Disp-30 Capsule, R-2  -     buPROPion (WELLBUTRIN) 75 mg tablet; Take 1 Tablet by mouth daily. , Normal, Disp-90 Tablet, R-1  -     TSH RFX ON ABNORMAL TO FREE T4  Increasing citalopram 20 mg to 40 mg daily, adding hydroxyzine 50 mg 4 times daily as needed for anxiety, continue Wellbutrin 75 mg daily. Recommend counseling as well, patient declines at this time. 2. Pure hypercholesterolemia  Chronic  -     atorvastatin (LIPITOR) 10 mg tablet; TAKE 1 TABLET BY MOUTH NIGHTLY FOR 90 DAYS, Normal, Disp-90 Tablet, R-1  3. Essential hypertension  Chronic  -     metoprolol tartrate (LOPRESSOR) 50 mg tablet; TAKE 1 TABLET BY MOUTH TWICE DAILY AS DIRECTED, Normal, Disp-180 Tablet, R-1  4. Gastroesophageal reflux disease without esophagitis  Chronic  -     omeprazole (PRILOSEC) 40 mg capsule; TAKE 1 CAPSULE BY MOUTH ONCE DAILY 30  MINUTES  BEFORE  EATING, Normal, Disp-90 Capsule, R-1  5. Chronic bilateral low back pain with bilateral sciatica  Chronic  -     meloxicam (MOBIC) 15 mg tablet; Take 1 Tablet by mouth daily as needed for Pain., Normal, Disp-30 Tablet, R-1  6. Morbid obesity (HCC)  Chronic  -     phentermine-topiramate (Qsymia) 3.75-23 mg CM24; Take 1 Capsule by mouth daily.  Max Daily Amount: 1 Capsule., Normal, Disp-30 Capsule, R-0  Trial of Qsymia, medication warnings discussed with patient, weekly weight checks, aerobic exercise at least 20 to 30 minutes daily and low calorie diet 1500. Return in about 3 months (around 6/30/2022). SUBJECTIVE/OBJECTIVE:  HPI    50-year-old female history of anxiety, depression, hypertension, GERD, chronic low back pain, hypercholesterolemia presents for anxiety and depression follow-up. Patient is almost out of medications and would like medication refills. Patient is currently taking citalopram 20 mg daily and Wellbutrin 75 mg daily for anxiety/depression. Patient describes her mood is irritable nearly every day. She endorses outburst directed at others at work and with her  at home. She cannot identify any particular triggers mentioning that her mood is volatile and she is easily aggravated. She denies suicidal or homicidal ideation, plan or intent. Patient is also frustrated with not being able to lose weight. She has spoken to her PCP regarding this and is trying to follow a low calorie diet and is trying to exercise more but finds lifestyle changes challenging. LAMIN 2/7 3/31/2022   Feeling nervous, anxious, or on edge 3   Not being able to stop or control worrying 1   Worrying too much about different things 1   Trouble relaxing 3   Being so restless that it is hard to sit still 3   Becoming easily annoyed or irritable 3   Feeling afraid as if something awful might happen 3   LAMIN-7 Total Score 17     No Known Allergies  Current Outpatient Medications   Medication Sig    citalopram (CELEXA) 40 mg tablet Take 1 Tablet by mouth daily.  hydrOXYzine pamoate (VISTARIL) 50 mg capsule Take 1 Capsule by mouth four (4) times daily as needed for Anxiety.  atorvastatin (LIPITOR) 10 mg tablet TAKE 1 TABLET BY MOUTH NIGHTLY FOR 90 DAYS    buPROPion (WELLBUTRIN) 75 mg tablet Take 1 Tablet by mouth daily.     metoprolol tartrate (LOPRESSOR) 50 mg tablet TAKE 1 TABLET BY MOUTH TWICE DAILY AS DIRECTED    omeprazole (PRILOSEC) 40 mg capsule TAKE 1 CAPSULE BY MOUTH ONCE DAILY 30  MINUTES  BEFORE  EATING    meloxicam (MOBIC) 15 mg tablet Take 1 Tablet by mouth daily as needed for Pain.  phentermine-topiramate (Qsymia) 3.75-23 mg CM24 Take 1 Capsule by mouth daily. Max Daily Amount: 1 Capsule.  estradioL (ESTRACE) 0.01 % (0.1 mg/gram) vaginal cream Use twice a week as directed     No current facility-administered medications for this visit. Past Medical History:   Diagnosis Date    Arthritis     Depression     GERD (gastroesophageal reflux disease)     Hypertension      Past Surgical History:   Procedure Laterality Date    HX HYSTERECTOMY      HX TONSILLECTOMY      HX TUBAL LIGATION       Family History   Problem Relation Age of Onset    Heart Disease Mother     OSTEOARTHRITIS Father     Heart Disease Father      Social History     Tobacco Use   Smoking Status Never Smoker   Smokeless Tobacco Never Used         Review of Systems  Negative unless noted in HPI. /87 (BP 1 Location: Left upper arm, BP Patient Position: Sitting, BP Cuff Size: Adult)   Pulse 71   Temp 97.5 °F (36.4 °C) (Temporal)   Resp 16   Ht 5' 3\" (1.6 m)   Wt 202 lb (91.6 kg)   SpO2 97%   BMI 35.78 kg/m²    Physical Exam  Constitutional:       Appearance: Normal appearance. She is obese. HENT:      Head: Normocephalic and atraumatic. Cardiovascular:      Rate and Rhythm: Regular rhythm. Heart sounds: Normal heart sounds. Pulmonary:      Effort: Pulmonary effort is normal.      Breath sounds: Normal breath sounds. Abdominal:      Palpations: Abdomen is soft. Skin:     General: Skin is warm. Neurological:      General: No focal deficit present. Mental Status: She is alert.    Psychiatric:         Mood and Affect: Mood normal.         Behavior: Behavior normal.             On this date 03/31/2022 I have spent 40 minutes reviewing previous notes, test results and face to face with the patient discussing the diagnosis and importance of compliance with the treatment plan as well as documenting on the day of the visit. An electronic signature was used to authenticate this note.   -- Peter Marvin MD   [facility] [address]  [address]

## 2022-04-01 LAB — TSH SERPL DL<=0.005 MIU/L-ACNC: 0.63 UIU/ML (ref 0.45–4.5)

## 2022-08-21 ENCOUNTER — HOSPITAL ENCOUNTER (OUTPATIENT)
Dept: MAMMOGRAPHY | Age: 57
Discharge: HOME | End: 2022-08-21
Payer: COMMERCIAL

## 2022-08-21 ENCOUNTER — TRANSCRIBE ORDER (OUTPATIENT)
Dept: REGISTRATION | Age: 57
End: 2022-08-21

## 2022-08-21 DIAGNOSIS — Z12.31 SCREENING MAMMOGRAM, ENCOUNTER FOR: ICD-10-CM

## 2022-08-21 DIAGNOSIS — Z12.31 SCREENING MAMMOGRAM, ENCOUNTER FOR: Primary | ICD-10-CM

## 2022-08-21 PROCEDURE — 77063 BREAST TOMOSYNTHESIS BI: CPT

## 2022-08-21 PROCEDURE — 77067 SCR MAMMO BI INCL CAD: CPT

## 2022-08-22 NOTE — PROGRESS NOTES
MsTeto Kaur has no appointment with me please ask the  or you to schedule the appointment and please let her know that her mammogram is normal repeat in 1 year and she should see the gynecologist for proper GYN checkup

## 2022-08-28 ENCOUNTER — HOSPITAL ENCOUNTER (EMERGENCY)
Age: 57
Discharge: HOME | End: 2022-08-28
Attending: EMERGENCY MEDICINE
Payer: COMMERCIAL

## 2022-08-28 ENCOUNTER — APPOINTMENT (OUTPATIENT)
Dept: GENERAL RADIOLOGY | Age: 57
End: 2022-08-28
Attending: EMERGENCY MEDICINE
Payer: COMMERCIAL

## 2022-08-28 VITALS
TEMPERATURE: 98.1 F | BODY MASS INDEX: 34.55 KG/M2 | HEIGHT: 63 IN | OXYGEN SATURATION: 98 % | DIASTOLIC BLOOD PRESSURE: 85 MMHG | WEIGHT: 195 LBS | RESPIRATION RATE: 20 BRPM | SYSTOLIC BLOOD PRESSURE: 152 MMHG | HEART RATE: 77 BPM

## 2022-08-28 DIAGNOSIS — M75.52 ACUTE BURSITIS OF LEFT SHOULDER: Primary | ICD-10-CM

## 2022-08-28 DIAGNOSIS — K13.70 ORAL LESION: ICD-10-CM

## 2022-08-28 DIAGNOSIS — M54.50 LUMBAR BACK PAIN: ICD-10-CM

## 2022-08-28 PROCEDURE — 99284 EMERGENCY DEPT VISIT MOD MDM: CPT

## 2022-08-28 PROCEDURE — 74011000250 HC RX REV CODE- 250: Performed by: EMERGENCY MEDICINE

## 2022-08-28 PROCEDURE — 75810000054 HC ARTHOCENTISIS JOINT

## 2022-08-28 PROCEDURE — 73030 X-RAY EXAM OF SHOULDER: CPT

## 2022-08-28 PROCEDURE — 74011250636 HC RX REV CODE- 250/636: Performed by: EMERGENCY MEDICINE

## 2022-08-28 RX ORDER — IBUPROFEN 600 MG/1
600 TABLET ORAL
Qty: 20 TABLET | Refills: 0 | Status: SHIPPED | OUTPATIENT
Start: 2022-08-28

## 2022-08-28 RX ORDER — BUPIVACAINE HYDROCHLORIDE 5 MG/ML
3 INJECTION, SOLUTION EPIDURAL; INTRACAUDAL ONCE
Status: COMPLETED | OUTPATIENT
Start: 2022-08-28 | End: 2022-08-28

## 2022-08-28 RX ORDER — METHYLPREDNISOLONE ACETATE 40 MG/ML
40 INJECTION, SUSPENSION INTRA-ARTICULAR; INTRALESIONAL; INTRAMUSCULAR; SOFT TISSUE ONCE
Status: COMPLETED | OUTPATIENT
Start: 2022-08-28 | End: 2022-08-28

## 2022-08-28 RX ADMIN — METHYLPREDNISOLONE ACETATE 40 MG: 40 INJECTION, SUSPENSION INTRA-ARTICULAR; INTRALESIONAL; INTRAMUSCULAR; INTRASYNOVIAL; SOFT TISSUE at 14:12

## 2022-08-28 RX ADMIN — BUPIVACAINE HYDROCHLORIDE 15 MG: 5 INJECTION, SOLUTION EPIDURAL; INTRACAUDAL at 14:11

## 2022-08-28 NOTE — Clinical Note
[address]  [facility] EMERGENCY DEPT  [place] [address] [phone]  [phone]    Work/School Note    Date: 8/28/2022    To Whom It May concern:    Hilda Mcnair was seen and treated today in the emergency room by the following provider(s):  Attending Provider: Kimberley Murguia MD.      Hilda Mcnair is excused from work/school on 08/28/22 and 08/29/22. She is medically clear to return to work/school on 8/30/2022.    Modalities of left arm total shoulder better for the next 10 days    Sincerely,          Donald Renae

## 2022-08-28 NOTE — Clinical Note
[address]  [facility] EMERGENCY DEPT  [place] [phone]    Work/School Note    Date: 8/28/2022    To Whom It May concern:    Surendra Copeland was seen and treated today in the emergency room by the following provider(s):  Attending Provider: Onofre Sandoval MD.      Surendra Copeland is excused from work/school on 08/28/22 and 08/29/22. She is medically clear to return to work/school on 8/30/2022.    Modalities of left arm total shoulder better for the next 10 days    Sincerely,          Elisabeth Shah MD

## 2022-08-28 NOTE — ED PROVIDER NOTES
HPI 59-year-old female presents ED with primary complaint of left shoulder pain beginning gradually 2 weeks ago and much worse over the last several days unable to abduct her arm points to the [facility] area as the site of pain. Also with pain in the right lower lumbar back that has been intermittent problem for some time pain is nonradiating worse with movement no urinary symptoms. Also notes small ulcer on tongue. This has been a recurring problem painful and irritating present for 1 wee. k. Past Medical History:   Diagnosis Date    Arthritis     Depression     GERD (gastroesophageal reflux disease)     Hypertension        Past Surgical History:   Procedure Laterality Date    HX HYSTERECTOMY      HX TONSILLECTOMY      HX TUBAL LIGATION           Family History:   Problem Relation Age of Onset    Heart Disease Mother     OSTEOARTHRITIS Father     Heart Disease Father        Social History     Socioeconomic History    Marital status:      Spouse name: Not on file    Number of children: Not on file    Years of education: Not on file    Highest education level: Not on file   Occupational History    Not on file   Tobacco Use    Smoking status: Never    Smokeless tobacco: Never   Vaping Use    Vaping Use: Never used   Substance and Sexual Activity    Alcohol use: Not Currently    Drug use: Never    Sexual activity: Not on file   Other Topics Concern    Not on file   Social History Narrative    Not on file     Social Determinants of Health     Financial Resource Strain: Not on file   Food Insecurity: Not on file   Transportation Needs: Not on file   Physical Activity: Not on file   Stress: Not on file   Social Connections: Not on file   Intimate Partner Violence: Not on file   Housing Stability: Not on file         ALLERGIES: Patient has no known allergies. Review of Systems   Constitutional: Negative. HENT: Negative. Eyes: Negative.     Respiratory:  Negative for cough, chest tightness, shortness of breath and wheezing. Cardiovascular:  Negative for chest pain, palpitations and leg swelling. Gastrointestinal:  Negative for abdominal distention, abdominal pain, blood in stool, constipation, diarrhea, nausea and vomiting. Endocrine: Negative. Genitourinary:  Negative for difficulty urinating, dysuria, flank pain, frequency and hematuria. Musculoskeletal:  Positive for back pain. See HPI   Neurological:  Negative for dizziness, seizures, speech difficulty, weakness and numbness. Hematological: Negative. Psychiatric/Behavioral: Negative. All other systems reviewed and are negative. Vitals:    08/28/22 1320   BP: (!) 152/85   Pulse: 77   Resp: 20   Temp: 98.1 °F (36.7 °C)   SpO2: 98%   Weight: 88.5 kg (195 lb)   Height: 5' 3\" (1.6 m)            Physical Exam  Vitals and nursing note reviewed. Constitutional:       General: She is not in acute distress. Appearance: Normal appearance. She is not ill-appearing, toxic-appearing or diaphoretic. HENT:      Head: Normocephalic and atraumatic. Nose: Nose normal.      Mouth/Throat:      Mouth: Mucous membranes are moist.      Comments: Tiny oral lesion on the tip of the tongue approximately 1 to 2 mm white and clear appears to be aphthous ulcer though unusual location  Eyes:      Extraocular Movements: Extraocular movements intact. Conjunctiva/sclera: Conjunctivae normal.   Cardiovascular:      Rate and Rhythm: Normal rate and regular rhythm. Pulses: Normal pulses. Heart sounds: Normal heart sounds. No murmur heard. Pulmonary:      Effort: Pulmonary effort is normal. No respiratory distress. Breath sounds: Normal breath sounds. No wheezing, rhonchi or rales. Abdominal:      General: Bowel sounds are normal. There is no distension. Palpations: Abdomen is soft. There is no mass. Tenderness: There is no abdominal tenderness. There is no right CVA tenderness, left CVA tenderness, guarding or rebound. Hernia: No hernia is present. Musculoskeletal:         General: Tenderness present. No swelling or signs of injury. Cervical back: Normal range of motion and neck supple. No rigidity or tenderness. Right lower leg: No edema. Left lower leg: No edema. Comments: Unable to abduct left shoulder greater than 60 degrees due to pain [facility] area there is no scapular tenderness; passive range of motion is normal with some discomfort there is palpable crepitus in the joint    Lumbar back is tender in the paravertebral muscles lower back for L5 no spinous process tenderness neurologic exam lower extremity shows no deficit   Lymphadenopathy:      Cervical: No cervical adenopathy. Skin:     General: Skin is warm and dry. Capillary Refill: Capillary refill takes less than 2 seconds. Findings: No erythema, lesion or rash. Neurological:      General: No focal deficit present. Mental Status: She is alert and oriented to person, place, and time. Mental status is at baseline. Cranial Nerves: No cranial nerve deficit. Sensory: No sensory deficit. Psychiatric:         Mood and Affect: Mood normal.         Behavior: Behavior normal.         Thought Content: Thought content normal.        MDM  Left shoulder x-ray shows no acute finding       Procedures    Left AC bursa injected prepped with chlorhexidine and injected 40 mg of Depo-Medrol mixed with Marcaine patient tolerated well.   Will treat with Motrin 600 3 times daily for AC bursitis and back pain suggestive ENT follow-up for oral lesion

## 2022-12-09 ENCOUNTER — HOSPITAL ENCOUNTER (EMERGENCY)
Age: 57
Discharge: HOME | End: 2022-12-09
Attending: EMERGENCY MEDICINE
Payer: COMMERCIAL

## 2022-12-09 ENCOUNTER — APPOINTMENT (OUTPATIENT)
Dept: CT IMAGING | Age: 57
End: 2022-12-09
Attending: EMERGENCY MEDICINE
Payer: COMMERCIAL

## 2022-12-09 VITALS
HEIGHT: 63 IN | RESPIRATION RATE: 20 BRPM | BODY MASS INDEX: 35.26 KG/M2 | TEMPERATURE: 98 F | OXYGEN SATURATION: 98 % | SYSTOLIC BLOOD PRESSURE: 142 MMHG | DIASTOLIC BLOOD PRESSURE: 88 MMHG | HEART RATE: 65 BPM | WEIGHT: 199 LBS

## 2022-12-09 DIAGNOSIS — B34.9 VIRAL ILLNESS: ICD-10-CM

## 2022-12-09 DIAGNOSIS — R10.9 FLANK PAIN: Primary | ICD-10-CM

## 2022-12-09 LAB
ALBUMIN SERPL-MCNC: 3.7 G/DL (ref 3.5–5)
ALBUMIN/GLOB SERPL: 1.1 {RATIO} (ref 1.1–2.2)
ALP SERPL-CCNC: 106 U/L (ref 45–117)
ALT SERPL-CCNC: 29 U/L (ref 12–78)
ANION GAP SERPL CALC-SCNC: 4 MMOL/L (ref 5–15)
AST SERPL W P-5'-P-CCNC: 22 U/L (ref 15–37)
BASOPHILS # BLD: 0.1 K/UL (ref 0–0.1)
BASOPHILS NFR BLD: 1 % (ref 0–1)
BILIRUB SERPL-MCNC: 0.4 MG/DL (ref 0.2–1)
BUN SERPL-MCNC: 8 MG/DL (ref 6–20)
BUN/CREAT SERPL: 9 (ref 12–20)
CA-I BLD-MCNC: 9.2 MG/DL (ref 8.5–10.1)
CHLORIDE SERPL-SCNC: 102 MMOL/L (ref 97–108)
CO2 SERPL-SCNC: 29 MMOL/L (ref 21–32)
CREAT SERPL-MCNC: 0.86 MG/DL (ref 0.55–1.02)
DIFFERENTIAL METHOD BLD: NORMAL
EOSINOPHIL # BLD: 0.4 K/UL (ref 0–0.4)
EOSINOPHIL NFR BLD: 6 % (ref 0–7)
ERYTHROCYTE [DISTWIDTH] IN BLOOD BY AUTOMATED COUNT: 13.2 % (ref 11.5–14.5)
GLOBULIN SER CALC-MCNC: 3.4 G/DL (ref 2–4)
GLUCOSE SERPL-MCNC: 92 MG/DL (ref 65–100)
HCT VFR BLD AUTO: 39.9 % (ref 35–47)
HGB BLD-MCNC: 13.3 G/DL (ref 11.5–16)
IMM GRANULOCYTES # BLD AUTO: 0 K/UL (ref 0–0.04)
IMM GRANULOCYTES NFR BLD AUTO: 0 % (ref 0–0.5)
INR PPP: 1.1 (ref 0.9–1.1)
LIPASE SERPL-CCNC: 126 U/L (ref 73–393)
LYMPHOCYTES # BLD: 3 K/UL (ref 0.8–3.5)
LYMPHOCYTES NFR BLD: 49 % (ref 12–49)
MCH RBC QN AUTO: 29.5 PG (ref 26–34)
MCHC RBC AUTO-ENTMCNC: 33.3 G/DL (ref 30–36.5)
MCV RBC AUTO: 88.5 FL (ref 80–99)
MONOCYTES # BLD: 0.5 K/UL (ref 0–1)
MONOCYTES NFR BLD: 8 % (ref 5–13)
NEUTS SEG # BLD: 2.3 K/UL (ref 1.8–8)
NEUTS SEG NFR BLD: 36 % (ref 32–75)
NRBC # BLD: 0 K/UL (ref 0–0.01)
NRBC BLD-RTO: 0 PER 100 WBC
PLATELET # BLD AUTO: 277 K/UL (ref 150–400)
PMV BLD AUTO: 9.3 FL (ref 8.9–12.9)
POTASSIUM SERPL-SCNC: 3.6 MMOL/L (ref 3.5–5.1)
PROT SERPL-MCNC: 7.1 G/DL (ref 6.4–8.2)
PROTHROMBIN TIME: 13.4 SEC (ref 11.9–14.6)
RBC # BLD AUTO: 4.51 M/UL (ref 3.8–5.2)
SODIUM SERPL-SCNC: 135 MMOL/L (ref 136–145)
WBC # BLD AUTO: 6.3 K/UL (ref 3.6–11)

## 2022-12-09 PROCEDURE — 85610 PROTHROMBIN TIME: CPT

## 2022-12-09 PROCEDURE — 99284 EMERGENCY DEPT VISIT MOD MDM: CPT

## 2022-12-09 PROCEDURE — 80053 COMPREHEN METABOLIC PANEL: CPT

## 2022-12-09 PROCEDURE — 74176 CT ABD & PELVIS W/O CONTRAST: CPT

## 2022-12-09 PROCEDURE — 83690 ASSAY OF LIPASE: CPT

## 2022-12-09 PROCEDURE — 85025 COMPLETE CBC W/AUTO DIFF WBC: CPT

## 2022-12-09 RX ORDER — IBUPROFEN 600 MG/1
600 TABLET ORAL
Qty: 20 TABLET | Refills: 0 | Status: SHIPPED | OUTPATIENT
Start: 2022-12-09

## 2022-12-09 NOTE — Clinical Note
[address]  [facility] EMERGENCY DEPT  [address] [id number]  [phone]    Work/School Note    Date: 12/9/2022    To Whom It May concern:    Ai Magdaleno was seen and treated today in the emergency room by the following provider(s):  Attending Provider: Maureen Perrin MD.      Ai Magdaleno is excused from work/school on 12/09/22 and 12/10/22. She is medically clear to return to work/school on 12/11/2022.        Sincerely,          Maria R Landis MD

## 2022-12-09 NOTE — ED TRIAGE NOTES
Pt reports cough, diarrhea, nausea, fatigue, body aches and pain to right chest wall with deep breathing x 2 weeks.

## 2022-12-09 NOTE — ED PROVIDER NOTES
,EMERGENCY DEPARTMENT HISTORY AND PHYSICAL EXAM      Date: 12/9/2022  Patient Name: Jeffrey Baumann    History of Presenting Illness     Chief Complaint   Patient presents with    Flu Like Symptoms       History Provided By: Patient    HPI: Jeffrey Baumann, 61 y.o. female past medical history significant for hypertension patient presents with complaint of 2 weeks of right chest wall right flank pain, pain is worse with movement pain intensity is 8/10, with associated fatigue and body aches also for 2 weeks, patient states that 3 days ago she is been having nonproductive cough; and for the past 2 days has been having abdominal pain that is achy and diarrhea after food ingestion, with pain intensity 4/10    There are no other complaints, changes, or physical findings at this time. Past History     Past Medical History:  Past Medical History:   Diagnosis Date    Arthritis     Depression     GERD (gastroesophageal reflux disease)     Hypertension        Past Surgical History:  Past Surgical History:   Procedure Laterality Date    HX HYSTERECTOMY      HX TONSILLECTOMY      HX TUBAL LIGATION         Family History:  Family History   Problem Relation Age of Onset    Heart Disease Mother     OSTEOARTHRITIS Father     Heart Disease Father        Social History:  Social History     Tobacco Use    Smoking status: Never    Smokeless tobacco: Never   Vaping Use    Vaping Use: Never used   Substance Use Topics    Alcohol use: Not Currently    Drug use: Never       Allergies:  No Known Allergies    PCP: Justin Aldridge MD    No current facility-administered medications on file prior to encounter. Current Outpatient Medications on File Prior to Encounter   Medication Sig Dispense Refill    ibuprofen (MOTRIN) 600 mg tablet Take 1 Tablet by mouth every eight (8) hours as needed for Pain. 20 Tablet 0    citalopram (CELEXA) 40 mg tablet Take 1 Tablet by mouth daily.  90 Tablet 1    hydrOXYzine pamoate (VISTARIL) 50 mg capsule Take 1 Capsule by mouth four (4) times daily as needed for Anxiety. 30 Capsule 2    atorvastatin (LIPITOR) 10 mg tablet TAKE 1 TABLET BY MOUTH NIGHTLY FOR 90 DAYS 90 Tablet 1    buPROPion (WELLBUTRIN) 75 mg tablet Take 1 Tablet by mouth daily. 90 Tablet 1    metoprolol tartrate (LOPRESSOR) 50 mg tablet TAKE 1 TABLET BY MOUTH TWICE DAILY AS DIRECTED 180 Tablet 1    omeprazole (PRILOSEC) 40 mg capsule TAKE 1 CAPSULE BY MOUTH ONCE DAILY 30  MINUTES  BEFORE  EATING 90 Capsule 1    meloxicam (MOBIC) 15 mg tablet Take 1 Tablet by mouth daily as needed for Pain. 30 Tablet 1    phentermine-topiramate (Qsymia) 3.75-23 mg CM24 Take 1 Capsule by mouth daily. Max Daily Amount: 1 Capsule. 30 Capsule 0    estradioL (ESTRACE) 0.01 % (0.1 mg/gram) vaginal cream Use twice a week as directed 42.5 g 0       Review of Systems   Review of Systems   Constitutional:  Negative for chills and fever. HENT:  Negative for rhinorrhea and sore throat. Eyes:  Negative for pain and visual disturbance. Respiratory:  Positive for cough. Negative for shortness of breath. Cardiovascular:  Negative for chest pain and leg swelling. Gastrointestinal:  Positive for abdominal pain, diarrhea and nausea. Negative for vomiting. Endocrine: Negative for polydipsia and polyuria. Genitourinary:  Positive for flank pain. Negative for dysuria and hematuria. Musculoskeletal:  Negative for back pain and neck pain. Skin:  Negative for color change and pallor. Neurological:  Negative for weakness and headaches. Psychiatric/Behavioral:  Negative for agitation and suicidal ideas. Physical Exam   Physical Exam  Vitals and nursing note reviewed. Constitutional:       General: She is not in acute distress. Appearance: She is obese. She is not ill-appearing, toxic-appearing or diaphoretic. HENT:      Head: Normocephalic and atraumatic.       Right Ear: Tympanic membrane normal.      Left Ear: Tympanic membrane normal.      Nose: Nose normal. No congestion. Mouth/Throat:      Mouth: Mucous membranes are moist.      Pharynx: Oropharynx is clear. Eyes:      Extraocular Movements: Extraocular movements intact. Conjunctiva/sclera: Conjunctivae normal.      Pupils: Pupils are equal, round, and reactive to light. Cardiovascular:      Rate and Rhythm: Normal rate and regular rhythm. Pulses: Normal pulses. Heart sounds: Normal heart sounds. Pulmonary:      Effort: Pulmonary effort is normal.      Breath sounds: Normal breath sounds. Abdominal:      General: Bowel sounds are normal.      Palpations: Abdomen is soft. Tenderness: There is generalized abdominal tenderness. Musculoskeletal:         General: No tenderness, deformity or signs of injury. Normal range of motion. Cervical back: Normal range of motion and neck supple. No rigidity or tenderness. Lymphadenopathy:      Cervical: No cervical adenopathy. Skin:     General: Skin is warm and dry. Capillary Refill: Capillary refill takes less than 2 seconds. Findings: No rash. Neurological:      General: No focal deficit present. Mental Status: She is alert and oriented to person, place, and time. Cranial Nerves: No cranial nerve deficit. Sensory: No sensory deficit. Psychiatric:         Mood and Affect: Mood normal.         Behavior: Behavior normal.       Lab and Diagnostic Study Results   Labs -   No results found for this or any previous visit (from the past 12 hour(s)). Radiologic Studies -   @lastxrresult@  CT Results  (Last 48 hours)      None          CXR Results  (Last 48 hours)      None            Medical Decision Making and ED Course   Differential Diagnosis & Medical Decision Making Provider Note:   Abdominal pain DDx diverticulitis, nephrolithiasis, appendicitis    - I am the first provider for this patient.   I reviewed the vital signs, available nursing notes, past medical history, past surgical history, family history and social history. The patients presenting problems have been discussed, and they are in agreement with the care plan formulated and outlined with them. I have encouraged them to ask questions as they arise throughout their visit. Vital Signs-Reviewed the patient's vital signs. Patient Vitals for the past 12 hrs:   Temp Pulse Resp BP SpO2   12/09/22 1123 98 °F (36.7 °C) 65 20 (!) 142/88 98 %       ED Course:          Procedures   Performed by: Lucia Choe MD  Procedures      Disposition   Disposition: Condition stable and improved  DC- Adult Discharges: All of the diagnostic tests were reviewed and questions answered. Diagnosis, care plan and treatment options were discussed. The patient understands the instructions and will follow up as directed. The patients results have been reviewed with them. They have been counseled regarding their diagnosis. The patient verbally convey understanding and agreement of the signs, symptoms, diagnosis, treatment and prognosis and additionally agrees to follow up as recommended with their PCP in 24 - 48 hours. They also agree with the care-plan and convey that all of their questions have been answered. I have also put together some discharge instructions for them that include: 1) educational information regarding their diagnosis, 2) how to care for their diagnosis at home, as well a 3) list of reasons why they would want to return to the ED prior to their follow-up appointment, should their condition change. DISCHARGE PLAN:  1. Current Discharge Medication List        CONTINUE these medications which have NOT CHANGED    Details   ibuprofen (MOTRIN) 600 mg tablet Take 1 Tablet by mouth every eight (8) hours as needed for Pain. Qty: 20 Tablet, Refills: 0      citalopram (CELEXA) 40 mg tablet Take 1 Tablet by mouth daily.   Qty: 90 Tablet, Refills: 1    Associated Diagnoses: Anxiety and depression      hydrOXYzine pamoate (VISTARIL) 50 mg capsule Take 1 Capsule by mouth four (4) times daily as needed for Anxiety. Qty: 30 Capsule, Refills: 2    Associated Diagnoses: Anxiety and depression      atorvastatin (LIPITOR) 10 mg tablet TAKE 1 TABLET BY MOUTH NIGHTLY FOR 90 DAYS  Qty: 90 Tablet, Refills: 1    Associated Diagnoses: Pure hypercholesterolemia      buPROPion (WELLBUTRIN) 75 mg tablet Take 1 Tablet by mouth daily. Qty: 90 Tablet, Refills: 1    Associated Diagnoses: Anxiety and depression      metoprolol tartrate (LOPRESSOR) 50 mg tablet TAKE 1 TABLET BY MOUTH TWICE DAILY AS DIRECTED  Qty: 180 Tablet, Refills: 1    Associated Diagnoses: Essential hypertension      omeprazole (PRILOSEC) 40 mg capsule TAKE 1 CAPSULE BY MOUTH ONCE DAILY 30  MINUTES  BEFORE  EATING  Qty: 90 Capsule, Refills: 1    Associated Diagnoses: Gastroesophageal reflux disease without esophagitis      meloxicam (MOBIC) 15 mg tablet Take 1 Tablet by mouth daily as needed for Pain. Qty: 30 Tablet, Refills: 1    Associated Diagnoses: Chronic bilateral low back pain with bilateral sciatica      phentermine-topiramate (Qsymia) 3.75-23 mg CM24 Take 1 Capsule by mouth daily. Max Daily Amount: 1 Capsule. Qty: 30 Capsule, Refills: 0    Associated Diagnoses: Morbid obesity (HCC)      estradioL (ESTRACE) 0.01 % (0.1 mg/gram) vaginal cream Use twice a week as directed  Qty: 42.5 g, Refills: 0           2. Follow-up Information    None       3. Return to ED if worse   4. Current Discharge Medication List         Remove if admitted/transferred    Diagnosis/Clinical Impression     Clinical Impression: No diagnosis found. Attestations: Ban MANZO MD, am the primary clinician of record. Please note that this dictation was completed with Sansan, the [employer] voice recognition software. Quite often unanticipated grammatical, syntax, homophones, and other interpretive errors are inadvertently transcribed by the computer software. Please disregard these errors.   Please excuse any errors that have escaped final proofreading. Thank you.

## 2022-12-13 ENCOUNTER — HOSPITAL ENCOUNTER (OUTPATIENT)
Dept: GENERAL RADIOLOGY | Age: 57
End: 2022-12-13
Attending: STUDENT IN AN ORGANIZED HEALTH CARE EDUCATION/TRAINING PROGRAM
Payer: COMMERCIAL

## 2022-12-13 ENCOUNTER — OFFICE VISIT (OUTPATIENT)
Dept: INTERNAL MEDICINE CLINIC | Age: 57
End: 2022-12-13
Payer: COMMERCIAL

## 2022-12-13 ENCOUNTER — HOSPITAL ENCOUNTER (OUTPATIENT)
Dept: GENERAL RADIOLOGY | Age: 57
Discharge: HOME | End: 2022-12-13
Payer: COMMERCIAL

## 2022-12-13 VITALS
RESPIRATION RATE: 18 BRPM | TEMPERATURE: 98.4 F | HEIGHT: 63 IN | OXYGEN SATURATION: 98 % | DIASTOLIC BLOOD PRESSURE: 79 MMHG | HEART RATE: 98 BPM | BODY MASS INDEX: 35.44 KG/M2 | WEIGHT: 200 LBS | SYSTOLIC BLOOD PRESSURE: 133 MMHG

## 2022-12-13 DIAGNOSIS — M25.561 CHRONIC PAIN OF RIGHT KNEE: ICD-10-CM

## 2022-12-13 DIAGNOSIS — F41.9 ANXIETY AND DEPRESSION: ICD-10-CM

## 2022-12-13 DIAGNOSIS — E78.5 HYPERLIPIDEMIA, UNSPECIFIED HYPERLIPIDEMIA TYPE: ICD-10-CM

## 2022-12-13 DIAGNOSIS — K21.9 GASTROESOPHAGEAL REFLUX DISEASE WITHOUT ESOPHAGITIS: ICD-10-CM

## 2022-12-13 DIAGNOSIS — G89.29 CHRONIC PAIN OF LEFT KNEE: ICD-10-CM

## 2022-12-13 DIAGNOSIS — I10 ESSENTIAL HYPERTENSION: Primary | ICD-10-CM

## 2022-12-13 DIAGNOSIS — K14.8 TONGUE LESION: ICD-10-CM

## 2022-12-13 DIAGNOSIS — M25.562 CHRONIC PAIN OF LEFT KNEE: ICD-10-CM

## 2022-12-13 DIAGNOSIS — F32.A ANXIETY AND DEPRESSION: ICD-10-CM

## 2022-12-13 DIAGNOSIS — G89.29 CHRONIC PAIN OF RIGHT KNEE: ICD-10-CM

## 2022-12-13 PROCEDURE — 99214 OFFICE O/P EST MOD 30 MIN: CPT | Performed by: STUDENT IN AN ORGANIZED HEALTH CARE EDUCATION/TRAINING PROGRAM

## 2022-12-13 PROCEDURE — 73560 X-RAY EXAM OF KNEE 1 OR 2: CPT

## 2022-12-13 RX ORDER — METOPROLOL TARTRATE 50 MG/1
TABLET ORAL
Qty: 180 TABLET | Refills: 1 | Status: SHIPPED | OUTPATIENT
Start: 2022-12-13

## 2022-12-13 RX ORDER — ALBUTEROL SULFATE 90 UG/1
2 AEROSOL, METERED RESPIRATORY (INHALATION)
Qty: 18 G | Refills: 2 | Status: SHIPPED | OUTPATIENT
Start: 2022-12-13

## 2022-12-13 RX ORDER — OMEPRAZOLE 40 MG/1
CAPSULE, DELAYED RELEASE ORAL
Qty: 90 CAPSULE | Refills: 1 | Status: SHIPPED | OUTPATIENT
Start: 2022-12-13

## 2022-12-13 RX ORDER — ALBUTEROL SULFATE 90 UG/1
2 AEROSOL, METERED RESPIRATORY (INHALATION)
COMMUNITY
Start: 2022-01-13 | End: 2022-12-13 | Stop reason: SDUPTHER

## 2022-12-13 RX ORDER — BUPROPION HYDROCHLORIDE 75 MG/1
75 TABLET ORAL DAILY
Qty: 90 TABLET | Refills: 1 | Status: SHIPPED | OUTPATIENT
Start: 2022-12-13

## 2022-12-13 RX ORDER — CITALOPRAM 40 MG/1
40 TABLET, FILM COATED ORAL DAILY
Qty: 90 TABLET | Refills: 1 | Status: SHIPPED | OUTPATIENT
Start: 2022-12-13

## 2022-12-13 RX ORDER — HYDROXYZINE PAMOATE 50 MG/1
50 CAPSULE ORAL
Qty: 30 CAPSULE | Refills: 2 | Status: SHIPPED | OUTPATIENT
Start: 2022-12-13

## 2022-12-13 NOTE — PROGRESS NOTES
1. \"Have you been to the ER, urgent care clinic since your last visit? Hospitalized since your last visit? \" Yes When: 12/09/2022 Where: [facility]  Reason for visit: Back pain     2. \"Have you seen or consulted any other health care providers outside of the [address] [place] [address] since your last visit? \" No     3. For patients aged 39-70: Has the patient had a colonoscopy / FIT/ Cologuard? Yes - Care Gap present. Rooming MA/LPN to request most recent results    Pt states that she had the cologuard done and the results are negative. If the patient is female:    4. For patients aged 41-77: Has the patient had a mammogram within the past 2 years? Yes - Care Gap present. Most recent result on file      5. For patients aged 21-65: Has the patient had a pap smear?  No    Chief Complaint   Patient presents with    Follow-up    Hypertension    GERD    Back Pain    Anxiety    Cholesterol Problem    Obesity     Visit Vitals  BP (!) 148/79 (BP 1 Location: Right upper arm, BP Patient Position: Sitting, BP Cuff Size: Adult)   Pulse 98   Temp 98.4 °F (36.9 °C) (Oral)   Resp 18   Ht 5' 3\" (1.6 m)   Wt 200 lb (90.7 kg)   SpO2 98%   BMI 35.43 kg/m²

## 2022-12-13 NOTE — PROGRESS NOTES
Miguel Avila (: 1965) is a 61 y.o. female, established patient, here for evaluation of the following chief complaint(s):  Follow-up, Hypertension, GERD, Back Pain, Anxiety, Cholesterol Problem, and Obesity     PCP: Dr. Sandra Painter    ASSESSMENT/PLAN:  Below is the assessment and plan developed based on review of pertinent history, physical exam, labs, studies, and medications. 1. Essential hypertension  -     metoprolol tartrate (LOPRESSOR) 50 mg tablet; TAKE 1 TABLET BY MOUTH TWICE DAILY AS DIRECTED, Normal, Disp-180 Tablet, R-1  2. Anxiety and depression  -     buPROPion (WELLBUTRIN) 75 mg tablet; Take 1 Tablet by mouth daily. , Normal, Disp-90 Tablet, R-1  -     citalopram (CELEXA) 40 mg tablet; Take 1 Tablet by mouth daily. , Normal, Disp-90 Tablet, R-1  -     hydrOXYzine pamoate (VISTARIL) 50 mg capsule; Take 1 Capsule by mouth three (3) times daily as needed for Anxiety., Normal, Disp-30 Capsule, R-2  3. Hyperlipidemia, unspecified hyperlipidemia type  -     LIPID PANEL  4. Gastroesophageal reflux disease without esophagitis  -     omeprazole (PRILOSEC) 40 mg capsule; TAKE 1 CAPSULE BY MOUTH ONCE DAILY 30  MINUTES  BEFORE  EATING, Normal, Disp-90 Capsule, R-1  5. Tongue lesion  -     REFERRAL TO ENT-OTOLARYNGOLOGY  6. Chronic pain of right knee  -     XR KNEE RT MAX 2 VWS; Future    Hypertension: Controlled, continue current medications. Refills given    Anxiety/depression: Patient is on Wellbutrin, Celexa and takes hydroxyzine as needed. States this regimen works for her. Refills given. Recommended to follow-up with psychiatrist, but she refused. Hyperlipidemia: States she was out of her cholesterol medication for some time, lipid panel ordered. Will prescribe medication depending on results    GERD: Omeprazole refills given    Tongue lesion noted on the tip: Unsure of etiology. Referral to ENT given  Knee pain: Could be related to osteoarthritis, recommend over-the-counter Tylenol as needed. X-ray ordered    Refills for choelsterol meds once we have results       Return in about 6 months (around 6/13/2023) for follow up with pcp . SUBJECTIVE/OBJECTIVE:  YESENIA Ceja is a 18-year-old presents to the clinic for a follow-up. Recently had flu like sx, went to ER. She states Sx are better. She also had some back pain, she had a CT abdomen without abnormality. She was told it could  be related to muscular strain. She has history of hypertension, GERD, hyperlipidemia, asthma, depression. She does not have her medications with her, states she ran out of the medications recently. Hypertension: Currently on metoprolol. Anxiety/Depression: She is on Wellbutrin, Celexa as well as hydroxyzine as needed as needed for anxiety. She was offered a referral to psychiatry and counseling in the past but refused. She also complains of some right knee pain and swelling which has been going on since a couple months. Denies any erythema. No recent history of trauma. She also complains of a tongue lesion at the tip of her tongue since 8 months. No trauma, no other lesions. No Known Allergies  Current Outpatient Medications   Medication Sig    albuterol (PROVENTIL HFA, VENTOLIN HFA, PROAIR HFA) 90 mcg/actuation inhaler Take 2 Puffs by inhalation every six (6) hours as needed for Wheezing or Shortness of Breath. buPROPion (WELLBUTRIN) 75 mg tablet Take 1 Tablet by mouth daily. citalopram (CELEXA) 40 mg tablet Take 1 Tablet by mouth daily. hydrOXYzine pamoate (VISTARIL) 50 mg capsule Take 1 Capsule by mouth three (3) times daily as needed for Anxiety. metoprolol tartrate (LOPRESSOR) 50 mg tablet TAKE 1 TABLET BY MOUTH TWICE DAILY AS DIRECTED    omeprazole (PRILOSEC) 40 mg capsule TAKE 1 CAPSULE BY MOUTH ONCE DAILY 30  MINUTES  BEFORE  EATING    ibuprofen (MOTRIN) 600 mg tablet Take 1 Tablet by mouth every six (6) hours as needed for Pain.     atorvastatin (LIPITOR) 10 mg tablet TAKE 1 TABLET BY MOUTH NIGHTLY FOR 90 DAYS     No current facility-administered medications for this visit. Past Medical History:   Diagnosis Date    Arthritis     Depression     GERD (gastroesophageal reflux disease)     Hypertension      Past Surgical History:   Procedure Laterality Date    HX HYSTERECTOMY      HX TONSILLECTOMY      HX TUBAL LIGATION       Family History   Problem Relation Age of Onset    Heart Disease Mother     OSTEOARTHRITIS Father     Heart Disease Father      Social History     Tobacco Use   Smoking Status Never   Smokeless Tobacco Never         Review of Systems   Constitutional:  Negative for fever and malaise/fatigue. HENT:  Negative for congestion, hearing loss and sore throat. Eyes: Negative. Respiratory:  Negative for cough and shortness of breath. Cardiovascular:  Negative for chest pain, palpitations and leg swelling. Gastrointestinal:  Negative for abdominal pain, nausea and vomiting. Genitourinary: Negative. Musculoskeletal:         Rt knee pain   Skin: Negative. Neurological:  Negative for focal weakness, loss of consciousness and headaches. Psychiatric/Behavioral: Negative. Vitals:    12/13/22 1056 12/13/22 1109   BP: (!) 148/79 133/79   Pulse: 98    Resp: 18    Temp: 98.4 °F (36.9 °C)    TempSrc: Oral    SpO2: 98%    Weight: 200 lb (90.7 kg)    Height: 5' 3\" (1.6 m)       Physical Exam  Constitutional:       General: She is not in acute distress. Appearance: Normal appearance. HENT:      Head: Normocephalic. Nose: Nose normal.      Mouth/Throat:      Comments: Whitish skin lesion noted at the tip of the tongue   Eyes:      Extraocular Movements: Extraocular movements intact. Conjunctiva/sclera: Conjunctivae normal.      Pupils: Pupils are equal, round, and reactive to light. Cardiovascular:      Rate and Rhythm: Normal rate and regular rhythm. Heart sounds: Normal heart sounds.    Pulmonary:      Effort: Pulmonary effort is normal.      Breath sounds: Normal breath sounds. Abdominal:      General: Bowel sounds are normal. There is no distension. Palpations: Abdomen is soft. Tenderness: There is no abdominal tenderness. Musculoskeletal:      Cervical back: Normal range of motion and neck supple. Comments: Mild fullness of the knee, no erythema, no warmth    Neurological:      General: No focal deficit present. Mental Status: She is alert. Mental status is at baseline. Motor: No weakness. Psychiatric:         Mood and Affect: Mood normal.         Behavior: Behavior normal.               An electronic signature was used to authenticate this note.   -- Sheridan Bentley MD

## 2022-12-14 LAB
CHOLEST SERPL-MCNC: 263 MG/DL (ref 100–199)
COMMENT:: ABNORMAL
HDLC SERPL-MCNC: 42 MG/DL
LDLC SERPL CALC-MCNC: 200 MG/DL (ref 0–99)
TRIGL SERPL-MCNC: 117 MG/DL (ref 0–149)
VLDLC SERPL CALC-MCNC: 21 MG/DL (ref 5–40)

## 2022-12-19 NOTE — PROGRESS NOTES
Your x-ray of the knee shows osteoarthritis, which is age-related wear-and-tear of the joints. It also shows some effusion or fluid in your joint. Recommend rest, ice packs and using over-the-counter NSAIDs such as ibuprofen or Aleve for the pain or swelling. Also shows that your patella or kneecap, sitting a little bit higher than normal.  I have placed a referral to ortho.

## 2022-12-19 NOTE — PROGRESS NOTES
Your cholesterol levels are significantly elevated, including total and LDL-bad cholesterol. I will send in a cholesterol medication which needs to be taken daily. Medication sent to pharmacy   Recommend low-fat/low-carb diet and exercise at least 5 days a week.

## 2023-05-19 RX ORDER — IBUPROFEN 600 MG/1
600 TABLET ORAL EVERY 6 HOURS PRN
COMMUNITY
Start: 2022-12-09

## 2023-05-19 RX ORDER — ALBUTEROL SULFATE 90 UG/1
2 AEROSOL, METERED RESPIRATORY (INHALATION) EVERY 6 HOURS PRN
COMMUNITY
Start: 2022-12-13

## 2023-05-19 RX ORDER — CITALOPRAM 40 MG/1
40 TABLET ORAL DAILY
COMMUNITY
Start: 2022-12-13

## 2023-05-19 RX ORDER — METOPROLOL TARTRATE 50 MG/1
TABLET, FILM COATED ORAL
COMMUNITY
Start: 2022-12-13

## 2023-05-19 RX ORDER — HYDROXYZINE PAMOATE 50 MG/1
50 CAPSULE ORAL 3 TIMES DAILY PRN
COMMUNITY
Start: 2022-12-13

## 2023-05-19 RX ORDER — ATORVASTATIN CALCIUM 20 MG/1
20 TABLET, FILM COATED ORAL DAILY
COMMUNITY
Start: 2022-12-19

## 2023-05-19 RX ORDER — OMEPRAZOLE 40 MG/1
CAPSULE, DELAYED RELEASE ORAL
COMMUNITY
Start: 2022-12-13

## 2023-05-19 RX ORDER — BUPROPION HYDROCHLORIDE 75 MG/1
75 TABLET ORAL DAILY
COMMUNITY
Start: 2022-12-13

## 2023-06-10 PROBLEM — Z11.59 NEED FOR HEPATITIS C SCREENING TEST: Status: ACTIVE | Noted: 2023-06-10

## 2023-06-10 PROBLEM — E55.9 VITAMIN D DEFICIENCY: Status: RESOLVED | Noted: 2021-02-15 | Resolved: 2023-06-10

## 2023-06-10 PROBLEM — Z12.11 SCREENING FOR COLON CANCER: Status: ACTIVE | Noted: 2023-06-10

## 2023-06-25 ENCOUNTER — HOSPITAL ENCOUNTER (OUTPATIENT)
Facility: HOSPITAL | Age: 58
Discharge: HOME | End: 2023-06-28
Payer: COMMERCIAL

## 2023-06-25 VITALS — WEIGHT: 194 LBS | BODY MASS INDEX: 34.37 KG/M2

## 2023-06-25 DIAGNOSIS — Z12.31 VISIT FOR SCREENING MAMMOGRAM: ICD-10-CM

## 2023-06-25 PROCEDURE — 77063 BREAST TOMOSYNTHESIS BI: CPT

## 2023-07-10 PROBLEM — Z12.11 SCREENING FOR COLON CANCER: Status: RESOLVED | Noted: 2023-06-10 | Resolved: 2023-07-10

## 2023-07-10 PROBLEM — Z11.59 NEED FOR HEPATITIS C SCREENING TEST: Status: RESOLVED | Noted: 2023-06-10 | Resolved: 2023-07-10

## 2023-07-24 ENCOUNTER — TELEPHONE (OUTPATIENT)
Facility: CLINIC | Age: 58
End: 2023-07-24

## 2023-07-24 NOTE — TELEPHONE ENCOUNTER
----- Message from Anyi Dejesus MD sent at 6/29/2023  9:54 AM EDT -----  She has no appointment with me,not seen since 3/2021    Her mammogram is normal    she went for mammogram walk in  and my name is as her pcp  she should not do that because she has not seen me since March 2021.

## 2023-12-21 DIAGNOSIS — K14.8 TONGUE LESION: ICD-10-CM

## 2024-01-03 ENCOUNTER — TELEPHONE (OUTPATIENT)
Age: 59
End: 2024-01-03

## 2024-01-03 NOTE — TELEPHONE ENCOUNTER
Pt called stated orders for blood work hadn't been sent to PCP & meds from last visit hasn't been sent to her pharmacy

## 2024-01-10 ENCOUNTER — TELEPHONE (OUTPATIENT)
Age: 59
End: 2024-01-10

## 2024-01-10 ENCOUNTER — HOSPITAL ENCOUNTER (OUTPATIENT)
Facility: HOSPITAL | Age: 59
Discharge: HOME | End: 2024-01-13
Payer: MEDICAID

## 2024-01-10 PROCEDURE — 36415 COLL VENOUS BLD VENIPUNCTURE: CPT

## 2024-01-10 PROCEDURE — 87389 HIV-1 AG W/HIV-1&-2 AB AG IA: CPT

## 2024-01-10 PROCEDURE — 82607 VITAMIN B-12: CPT

## 2024-01-10 PROCEDURE — 84207 ASSAY OF VITAMIN B-6: CPT

## 2024-01-10 PROCEDURE — 82746 ASSAY OF FOLIC ACID SERUM: CPT

## 2024-01-10 NOTE — TELEPHONE ENCOUNTER
Called pharmacy to check on pt script for mouthwash , pharmacy tech Luisa stated they don't do Miracle mouthwash there & pt will have to get this filled elsewhere. Will call pt to notify & request a different pharmacy preference

## 2024-01-10 NOTE — TELEPHONE ENCOUNTER
Called pt to notify her that her pharmacy doesn't supply her script , asked pt where else would she like this sent to she stated she'd like to try the [facility] in [place]. Thank you !

## 2024-01-10 NOTE — TELEPHONE ENCOUNTER
Pt lvm stating pharmacy hadn't received script from office visit on 12/20/23 , notified pt this had been sent & confirmed to her [facility] pharmacy on 12/20 she also stated her [facility] orders hadnt been received by her PCP which I also informed her had been sent on 1/5/24 faxed & confirmed as well. Told pt id be glad to refax orders & have a provider resend script from office visit. Can someone please resubmit mouthwash script for pt please, thank you !

## 2024-01-11 LAB
FOLATE SERPL-MCNC: 21.5 NG/ML (ref 5–21)
HIV 1+2 AB+HIV1 P24 AG SERPL QL IA: NONREACTIVE
HIV 1/2 RESULT COMMENT: NORMAL
VIT B12 SERPL-MCNC: 754 PG/ML (ref 193–986)

## 2024-01-12 DIAGNOSIS — K14.8 TONGUE LESION: Primary | ICD-10-CM

## 2024-01-14 ENCOUNTER — APPOINTMENT (OUTPATIENT)
Facility: HOSPITAL | Age: 59
End: 2024-01-14
Attending: STUDENT IN AN ORGANIZED HEALTH CARE EDUCATION/TRAINING PROGRAM
Payer: MEDICAID

## 2024-01-14 ENCOUNTER — HOSPITAL ENCOUNTER (EMERGENCY)
Facility: HOSPITAL | Age: 59
Discharge: HOME | End: 2024-01-14
Attending: STUDENT IN AN ORGANIZED HEALTH CARE EDUCATION/TRAINING PROGRAM
Payer: MEDICAID

## 2024-01-14 VITALS
OXYGEN SATURATION: 98 % | TEMPERATURE: 97.6 F | HEART RATE: 66 BPM | DIASTOLIC BLOOD PRESSURE: 66 MMHG | SYSTOLIC BLOOD PRESSURE: 139 MMHG | RESPIRATION RATE: 19 BRPM

## 2024-01-14 DIAGNOSIS — M79.674 GREAT TOE PAIN, RIGHT: Primary | ICD-10-CM

## 2024-01-14 PROCEDURE — 73630 X-RAY EXAM OF FOOT: CPT

## 2024-01-14 PROCEDURE — 99283 EMERGENCY DEPT VISIT LOW MDM: CPT

## 2024-01-14 ASSESSMENT — PAIN SCALES - GENERAL: PAINLEVEL_OUTOF10: 7

## 2024-01-14 ASSESSMENT — PAIN - FUNCTIONAL ASSESSMENT: PAIN_FUNCTIONAL_ASSESSMENT: 0-10

## 2024-01-14 NOTE — DISCHARGE INSTRUCTIONS
Thank you!  Thank you for allowing me to care for you in the emergency department. It is my goal to provide you with excellent care. If you have not received excellent quality care, please ask to speak to the nurse manager. Please fill out the survey that will come to you by mail or email since we listen to your feedback!     Below you will find a list of your tests from today's visit.  Should you have any questions, please do not hesitate to call the emergency department.    Labs  No results found for this or any previous visit (from the past 12 hour(s)).    Radiologic Studies  XR FOOT RIGHT (MIN 3 VIEWS)   Final Result   No acute abnormality.        ------------------------------------------------------------------------------------------------------------  The exam and treatment you received in the Emergency Department were for an urgent problem and are not intended as complete care. It is important that you follow-up with a doctor, nurse practitioner, or physician assistant to:  (1) confirm your diagnosis,  (2) re-evaluation of changes in your illness and treatment, and  (3) for ongoing care. Please take your discharge instructions with you when you go to your follow-up appointment.     If you have any problem arranging a follow-up appointment, contact the Emergency Department.  If your symptoms become worse or you do not improve as expected and you are unable to reach your health care provider, please return to the Emergency Department. We are available 24 hours a day.

## 2024-01-14 NOTE — ED PROVIDER NOTES
[facility] EMERGENCY DEPT  EMERGENCY DEPARTMENT HISTORY AND PHYSICAL EXAM      Date: 1/14/2024  Patient Name: Li Richmond  MRN: [id number]  YOB: 1965  Date of evaluation: 1/14/2024  Provider: Anand Torres MD   Note Started: 1:07 PM EST 1/14/24    HISTORY OF PRESENT ILLNESS     Chief Complaint   Patient presents with    Toe Pain       History Provided By: Patient    HPI: Li Richmond is a 58 y.o. female with history of arthritis, GERD, hypertension presents with right foot pain and swelling.  Patient reports 1 month of worsened pain and swelling of the right foot first toe MTP joint.  She says it is worse when she walks on it, she will have to walk on the lateral side of her foot.  When at rest, she does not have significant symptoms.  She denies any associated injuries.  She does not have a history of gout.  She has worse pain when it is extended.    PAST MEDICAL HISTORY   Past Medical History:  Past Medical History:   Diagnosis Date    Arthritis     Depression     GERD (gastroesophageal reflux disease)     Hypertension        Past Surgical History:  Past Surgical History:   Procedure Laterality Date    HYSTERECTOMY (CERVIX STATUS UNKNOWN)      TONSILLECTOMY      TUBAL LIGATION         Family History:  Family History   Problem Relation Age of Onset    Heart Disease Mother     Osteoarthritis Father     Heart Disease Father        Social History:  Social History     Tobacco Use    Smoking status: Never    Smokeless tobacco: Never   Substance Use Topics    Alcohol use: Not Currently    Drug use: Never       Allergies:  No Known Allergies    PCP: Dima Mancilla Sr., MD    Current Meds:   No current facility-administered medications for this encounter.     Current Outpatient Medications   Medication Sig Dispense Refill    Magic Mouthwash (MIRACLE MOUTHWASH) Swish and spit 5 mLs 4 times daily as needed for Irritation 500 mL 0    albuterol sulfate HFA (PROVENTIL;VENTOLIN;PROAIR) 108 (90 Base) MCG/ACT

## 2024-01-14 NOTE — ED TRIAGE NOTES
Pt reports right great toe pain that has been ongoing for a month, not relieved by OTC pain releif. Pt rates pain 7/10. Pt reports hx of arthritis. Denies injury.

## 2024-01-15 LAB — VIT B6 SERPL-MCNC: 18.9 UG/L (ref 3.4–65.2)

## 2024-01-17 DIAGNOSIS — K14.8 TONGUE LESION: ICD-10-CM

## 2024-01-19 LAB
Lab: NORMAL
REFERENCE LAB: NORMAL
REFERENCE LAB: NORMAL

## 2024-02-01 ENCOUNTER — TELEPHONE (OUTPATIENT)
Age: 59
End: 2024-02-01

## 2024-03-07 ENCOUNTER — OFFICE VISIT (OUTPATIENT)
Age: 59
End: 2024-03-07
Payer: COMMERCIAL

## 2024-03-07 VITALS
DIASTOLIC BLOOD PRESSURE: 88 MMHG | OXYGEN SATURATION: 95 % | HEIGHT: 63 IN | HEART RATE: 70 BPM | WEIGHT: 200 LBS | SYSTOLIC BLOOD PRESSURE: 138 MMHG | BODY MASS INDEX: 35.44 KG/M2

## 2024-03-07 DIAGNOSIS — K14.8 TONGUE LESION: ICD-10-CM

## 2024-03-07 PROCEDURE — 3079F DIAST BP 80-89 MM HG: CPT | Performed by: STUDENT IN AN ORGANIZED HEALTH CARE EDUCATION/TRAINING PROGRAM

## 2024-03-07 PROCEDURE — G8417 CALC BMI ABV UP PARAM F/U: HCPCS | Performed by: STUDENT IN AN ORGANIZED HEALTH CARE EDUCATION/TRAINING PROGRAM

## 2024-03-07 PROCEDURE — G8484 FLU IMMUNIZE NO ADMIN: HCPCS | Performed by: STUDENT IN AN ORGANIZED HEALTH CARE EDUCATION/TRAINING PROGRAM

## 2024-03-07 PROCEDURE — 3017F COLORECTAL CA SCREEN DOC REV: CPT | Performed by: STUDENT IN AN ORGANIZED HEALTH CARE EDUCATION/TRAINING PROGRAM

## 2024-03-07 PROCEDURE — G8427 DOCREV CUR MEDS BY ELIG CLIN: HCPCS | Performed by: STUDENT IN AN ORGANIZED HEALTH CARE EDUCATION/TRAINING PROGRAM

## 2024-03-07 PROCEDURE — 1036F TOBACCO NON-USER: CPT | Performed by: STUDENT IN AN ORGANIZED HEALTH CARE EDUCATION/TRAINING PROGRAM

## 2024-03-07 PROCEDURE — 99214 OFFICE O/P EST MOD 30 MIN: CPT | Performed by: STUDENT IN AN ORGANIZED HEALTH CARE EDUCATION/TRAINING PROGRAM

## 2024-03-07 PROCEDURE — 3075F SYST BP GE 130 - 139MM HG: CPT | Performed by: STUDENT IN AN ORGANIZED HEALTH CARE EDUCATION/TRAINING PROGRAM

## 2024-03-07 RX ORDER — VALACYCLOVIR HYDROCHLORIDE 1 G/1
1000 TABLET, FILM COATED ORAL 3 TIMES DAILY
Qty: 21 TABLET | Refills: 0 | Status: SHIPPED | OUTPATIENT
Start: 2024-03-07 | End: 2024-03-14

## 2024-03-07 NOTE — PROGRESS NOTES
Return in about 3 weeks (around 3/28/2024).     Plan for medical issues were discussed with patient including observation, medical management, and possible surgical/procedural intervention if they fail conservative therapy. The risks and benefits of the considered procedures were discussed with the patient. All patient questions were answered.     The patient was instructed to return to clinic if they have no improvement or progression of their symptoms. Signs to watch out for were reviewed with them.      Colby Sahni MD   [facility]  [address] [address]  Office Phone: [phone]

## 2024-03-11 ENCOUNTER — HOSPITAL ENCOUNTER (EMERGENCY)
Facility: HOSPITAL | Age: 59
Discharge: HOME | End: 2024-03-11
Attending: STUDENT IN AN ORGANIZED HEALTH CARE EDUCATION/TRAINING PROGRAM
Payer: MEDICAID

## 2024-03-11 ENCOUNTER — APPOINTMENT (OUTPATIENT)
Facility: HOSPITAL | Age: 59
End: 2024-03-11
Attending: STUDENT IN AN ORGANIZED HEALTH CARE EDUCATION/TRAINING PROGRAM
Payer: MEDICAID

## 2024-03-11 VITALS
BODY MASS INDEX: 34.55 KG/M2 | SYSTOLIC BLOOD PRESSURE: 141 MMHG | HEART RATE: 71 BPM | WEIGHT: 195 LBS | HEIGHT: 63 IN | RESPIRATION RATE: 19 BRPM | TEMPERATURE: 98 F | DIASTOLIC BLOOD PRESSURE: 71 MMHG | OXYGEN SATURATION: 100 %

## 2024-03-11 DIAGNOSIS — T14.8XXA MUSCLE STRAIN: Primary | ICD-10-CM

## 2024-03-11 PROCEDURE — 99283 EMERGENCY DEPT VISIT LOW MDM: CPT

## 2024-03-11 PROCEDURE — 6370000000 HC RX 637 (ALT 250 FOR IP)

## 2024-03-11 PROCEDURE — 71101 X-RAY EXAM UNILAT RIBS/CHEST: CPT

## 2024-03-11 RX ORDER — ACETAMINOPHEN 500 MG
TABLET ORAL
Status: COMPLETED
Start: 2024-03-11 | End: 2024-03-11

## 2024-03-11 RX ORDER — LIDOCAINE 50 MG/G
1 PATCH TOPICAL DAILY
Qty: 10 PATCH | Refills: 0 | Status: SHIPPED | OUTPATIENT
Start: 2024-03-11 | End: 2024-03-21

## 2024-03-11 RX ORDER — ACETAMINOPHEN 500 MG
1000 TABLET ORAL
Status: COMPLETED | OUTPATIENT
Start: 2024-03-11 | End: 2024-03-11

## 2024-03-11 RX ADMIN — Medication 1000 MG: at 16:58

## 2024-03-11 RX ADMIN — ACETAMINOPHEN 1000 MG: 500 TABLET ORAL at 16:58

## 2024-03-11 ASSESSMENT — PAIN - FUNCTIONAL ASSESSMENT
PAIN_FUNCTIONAL_ASSESSMENT: 0-10
PAIN_FUNCTIONAL_ASSESSMENT: 0-10

## 2024-03-11 ASSESSMENT — PAIN SCALES - GENERAL
PAINLEVEL_OUTOF10: 5
PAINLEVEL_OUTOF10: 10

## 2024-03-11 NOTE — DISCHARGE INSTRUCTIONS
Thank you!  Thank you for allowing me to care for you in the emergency department. It is my goal to provide you with excellent care.  Please fill out the survey that will come to you by mail or email since we listen to your feedback!     Below you will find a list of your tests from today's visit.  Should you have any questions, please do not hesitate to call the emergency department.    Labs  No results found for this or any previous visit (from the past 12 hour(s)).    Radiologic Studies  XR RIBS LEFT INCLUDE CHEST (MIN 3 VIEWS)   Final Result   No rib fracture identified.                 ------------------------------------------------------------------------------------------------------------  The exam and treatment you received in the Emergency Department were for an urgent problem and are not intended as complete care. It is important that you follow-up with a doctor, nurse practitioner, or physician assistant to:  (1) confirm your diagnosis,  (2) re-evaluation of changes in your illness and treatment, and (3) for ongoing care. Please take your discharge instructions with you when you go to your follow-up appointment.     If you have any problem arranging a follow-up appointment, contact the Emergency Department.  If your symptoms become worse or you do not improve as expected and you are unable to reach your health care provider, please return to the Emergency Department. We are available 24 hours a day.     If a prescription has been provided, please have it filled as soon as possible to prevent a delay in treatment. If you have any questions or reservations about taking the medication due to side effects or interactions with other medications, please call your primary care provider or contact the ER.

## 2024-03-11 NOTE — ED PROVIDER NOTES
Spoke with Gisela and she is going to fax over his last office note.   [facility] EMERGENCY DEPT  EMERGENCY DEPARTMENT HISTORY AND PHYSICAL EXAM      Date: 3/11/2024  Patient Name: Li Richmond  MRN: [id number]  YOB: 1965  Date of evaluation: 3/11/2024  Provider: Anand Torres MD   Note Started: 4:00 PM EDT 3/11/24    HISTORY OF PRESENT ILLNESS     Chief Complaint   Patient presents with    Rib Injury       History Provided By: Patient    HPI: Li Richmond is a 58 y.o. female presents with left-sided back pain after lifting furniture by herself today.  She reports pain below her left shoulder blade.    PAST MEDICAL HISTORY   Past Medical History:  Past Medical History:   Diagnosis Date    Arthritis     Depression     GERD (gastroesophageal reflux disease)     Hypertension        Past Surgical History:  Past Surgical History:   Procedure Laterality Date    HYSTERECTOMY (CERVIX STATUS UNKNOWN)      TONSILLECTOMY      TUBAL LIGATION         Family History:  Family History   Problem Relation Age of Onset    Heart Disease Mother     Osteoarthritis Father     Heart Disease Father        Social History:  Social History     Tobacco Use    Smoking status: Never    Smokeless tobacco: Never   Substance Use Topics    Alcohol use: Not Currently    Drug use: Never       Allergies:  No Known Allergies    PCP: Dima Mancilla Sr., MD    Current Meds:   No current facility-administered medications for this encounter.     Current Outpatient Medications   Medication Sig Dispense Refill    lidocaine (LIDODERM) 5 % Place 1 patch onto the skin daily for 10 days 12 hours on, 12 hours off. 10 patch 0    valACYclovir (VALTREX) 1 g tablet Take 1 tablet by mouth 3 times daily for 7 days 21 tablet 0    Magic Mouthwash (MIRACLE MOUTHWASH) 5ml diphenhydramine   5ml  Maalox  5 ml  2% viscous lidocaine   Swish ans spit 15ml three times a day for 10 days 300 mL 1    albuterol sulfate HFA (PROVENTIL;VENTOLIN;PROAIR) 108 (90 Base) MCG/ACT inhaler Inhale 2 puffs into the lungs every 6 hours as needed

## 2024-03-11 NOTE — ED TRIAGE NOTES
Pt reports she was lifting something heavy and felt a pop in her left upper back. Pt concerned for rib fx. States she has had a rib fx in the past and this feels similar.

## 2024-06-23 ENCOUNTER — TRANSCRIBE ORDERS (OUTPATIENT)
Facility: HOSPITAL | Age: 59
End: 2024-06-23

## 2024-06-23 ENCOUNTER — HOSPITAL ENCOUNTER (OUTPATIENT)
Facility: HOSPITAL | Age: 59
Discharge: HOME | End: 2024-06-26
Payer: COMMERCIAL

## 2024-06-23 DIAGNOSIS — Z12.31 VISIT FOR SCREENING MAMMOGRAM: ICD-10-CM

## 2024-06-23 DIAGNOSIS — Z12.31 VISIT FOR SCREENING MAMMOGRAM: Primary | ICD-10-CM

## 2024-06-23 PROCEDURE — 77063 BREAST TOMOSYNTHESIS BI: CPT

## 2025-06-22 ENCOUNTER — HOSPITAL ENCOUNTER (OUTPATIENT)
Facility: HOSPITAL | Age: 60
Discharge: HOME | End: 2025-06-25
Payer: COMMERCIAL

## 2025-06-22 ENCOUNTER — TRANSCRIBE ORDERS (OUTPATIENT)
Facility: HOSPITAL | Age: 60
End: 2025-06-22

## 2025-06-22 DIAGNOSIS — Z12.31 OTHER SCREENING MAMMOGRAM: Primary | ICD-10-CM

## 2025-06-22 DIAGNOSIS — Z12.31 OTHER SCREENING MAMMOGRAM: ICD-10-CM

## 2025-06-22 PROCEDURE — 77063 BREAST TOMOSYNTHESIS BI: CPT

## 2025-06-27 ENCOUNTER — HOSPITAL ENCOUNTER (OUTPATIENT)
Facility: HOSPITAL | Age: 60
Discharge: HOME | End: 2025-06-30
Payer: MEDICAID

## 2025-06-27 DIAGNOSIS — M54.59 WEIGHT LIFTER'S BACK: ICD-10-CM

## 2025-06-27 DIAGNOSIS — R52 PAIN: ICD-10-CM

## 2025-06-27 DIAGNOSIS — R10.30 INGUINAL PAIN, UNSPECIFIED LATERALITY: ICD-10-CM

## 2025-06-27 DIAGNOSIS — M25.552 LEFT HIP PAIN: ICD-10-CM

## 2025-06-27 PROCEDURE — 72100 X-RAY EXAM L-S SPINE 2/3 VWS: CPT

## 2025-06-27 PROCEDURE — 74018 RADEX ABDOMEN 1 VIEW: CPT

## 2025-06-27 PROCEDURE — 73502 X-RAY EXAM HIP UNI 2-3 VIEWS: CPT

## 2025-09-04 ENCOUNTER — HOSPITAL ENCOUNTER (OUTPATIENT)
Facility: HOSPITAL | Age: 60
Discharge: HOME | End: 2025-09-04
Payer: COMMERCIAL

## 2025-09-04 ENCOUNTER — TRANSCRIBE ORDERS (OUTPATIENT)
Facility: HOSPITAL | Age: 60
End: 2025-09-04

## 2025-09-04 DIAGNOSIS — M25.532 LEFT WRIST PAIN: ICD-10-CM

## 2025-09-04 DIAGNOSIS — M25.532 LEFT WRIST PAIN: Primary | ICD-10-CM

## 2025-09-04 PROCEDURE — 73110 X-RAY EXAM OF WRIST: CPT
